# Patient Record
Sex: MALE | Race: WHITE
[De-identification: names, ages, dates, MRNs, and addresses within clinical notes are randomized per-mention and may not be internally consistent; named-entity substitution may affect disease eponyms.]

---

## 2018-03-15 PROBLEM — Z00.00 ENCOUNTER FOR PREVENTIVE HEALTH EXAMINATION: Status: ACTIVE | Noted: 2018-03-15

## 2018-03-30 ENCOUNTER — APPOINTMENT (OUTPATIENT)
Dept: UROLOGY | Facility: CLINIC | Age: 58
End: 2018-03-30
Payer: MEDICARE

## 2018-03-30 VITALS
SYSTOLIC BLOOD PRESSURE: 193 MMHG | BODY MASS INDEX: 31.83 KG/M2 | HEIGHT: 68 IN | DIASTOLIC BLOOD PRESSURE: 88 MMHG | HEART RATE: 103 BPM | WEIGHT: 210 LBS

## 2018-03-30 DIAGNOSIS — Z78.9 OTHER SPECIFIED HEALTH STATUS: ICD-10-CM

## 2018-03-30 DIAGNOSIS — N52.9 MALE ERECTILE DYSFUNCTION, UNSPECIFIED: ICD-10-CM

## 2018-03-30 DIAGNOSIS — Z85.46 PERSONAL HISTORY OF MALIGNANT NEOPLASM OF PROSTATE: ICD-10-CM

## 2018-03-30 DIAGNOSIS — R31.29 OTHER MICROSCOPIC HEMATURIA: ICD-10-CM

## 2018-03-30 DIAGNOSIS — N20.0 CALCULUS OF KIDNEY: ICD-10-CM

## 2018-03-30 LAB
BILIRUB UR QL STRIP: NORMAL
CLARITY UR: CLEAR
COLLECTION METHOD: NORMAL
GLUCOSE UR-MCNC: 150
HCG UR QL: NORMAL EU/DL
HGB UR QL STRIP.AUTO: NORMAL
KETONES UR-MCNC: NORMAL
LEUKOCYTE ESTERASE UR QL STRIP: NORMAL
NITRITE UR QL STRIP: NORMAL
PH UR STRIP: 6
PROT UR STRIP-MCNC: 30
SP GR UR STRIP: 1.02

## 2018-03-30 PROCEDURE — 81003 URINALYSIS AUTO W/O SCOPE: CPT | Mod: QW

## 2018-03-30 PROCEDURE — 99213 OFFICE O/P EST LOW 20 MIN: CPT

## 2018-06-29 ENCOUNTER — APPOINTMENT (OUTPATIENT)
Dept: UROLOGY | Facility: CLINIC | Age: 58
End: 2018-06-29
Payer: MEDICARE

## 2018-06-29 VITALS
HEIGHT: 68 IN | DIASTOLIC BLOOD PRESSURE: 66 MMHG | WEIGHT: 210 LBS | SYSTOLIC BLOOD PRESSURE: 140 MMHG | BODY MASS INDEX: 31.83 KG/M2 | HEART RATE: 85 BPM

## 2018-06-29 PROCEDURE — 99213 OFFICE O/P EST LOW 20 MIN: CPT

## 2018-06-29 RX ORDER — BICALUTAMIDE 50 MG/1
50 TABLET ORAL DAILY
Qty: 90 | Refills: 3 | Status: ACTIVE | COMMUNITY
Start: 2018-06-29 | End: 1900-01-01

## 2018-07-05 ENCOUNTER — OTHER (OUTPATIENT)
Age: 58
End: 2018-07-05

## 2018-07-06 ENCOUNTER — TRANSCRIPTION ENCOUNTER (OUTPATIENT)
Age: 58
End: 2018-07-06

## 2018-07-06 ENCOUNTER — OTHER (OUTPATIENT)
Age: 58
End: 2018-07-06

## 2018-07-06 ENCOUNTER — MESSAGE (OUTPATIENT)
Age: 58
End: 2018-07-06

## 2018-07-12 ENCOUNTER — OUTPATIENT (OUTPATIENT)
Dept: OUTPATIENT SERVICES | Facility: HOSPITAL | Age: 58
LOS: 1 days | Discharge: HOME | End: 2018-07-12

## 2018-07-12 DIAGNOSIS — N41.3 PROSTATOCYSTITIS: ICD-10-CM

## 2018-07-13 ENCOUNTER — OUTPATIENT (OUTPATIENT)
Dept: OUTPATIENT SERVICES | Facility: HOSPITAL | Age: 58
LOS: 1 days | Discharge: HOME | End: 2018-07-13

## 2018-07-13 DIAGNOSIS — C61 MALIGNANT NEOPLASM OF PROSTATE: ICD-10-CM

## 2018-07-20 ENCOUNTER — MESSAGE (OUTPATIENT)
Age: 58
End: 2018-07-20

## 2018-09-28 ENCOUNTER — APPOINTMENT (OUTPATIENT)
Dept: UROLOGY | Facility: CLINIC | Age: 58
End: 2018-09-28
Payer: MEDICARE

## 2018-09-28 VITALS
HEIGHT: 68 IN | DIASTOLIC BLOOD PRESSURE: 71 MMHG | HEART RATE: 91 BPM | SYSTOLIC BLOOD PRESSURE: 142 MMHG | BODY MASS INDEX: 31.83 KG/M2 | WEIGHT: 210 LBS

## 2018-09-28 PROCEDURE — 99213 OFFICE O/P EST LOW 20 MIN: CPT

## 2019-02-15 ENCOUNTER — APPOINTMENT (OUTPATIENT)
Dept: UROLOGY | Facility: CLINIC | Age: 59
End: 2019-02-15
Payer: MEDICARE

## 2019-02-15 VITALS
DIASTOLIC BLOOD PRESSURE: 60 MMHG | BODY MASS INDEX: 30.03 KG/M2 | SYSTOLIC BLOOD PRESSURE: 127 MMHG | HEIGHT: 68 IN | HEART RATE: 95 BPM | WEIGHT: 198.13 LBS

## 2019-02-15 PROCEDURE — 99213 OFFICE O/P EST LOW 20 MIN: CPT

## 2019-02-15 NOTE — PHYSICAL EXAM
[General Appearance - In No Acute Distress] : no acute distress [Costovertebral Angle Tenderness] : no ~M costovertebral angle tenderness [Edema] : no peripheral edema [] : no respiratory distress [Oriented To Time, Place, And Person] : oriented to person, place, and time [Normal Station and Gait] : the gait and station were normal for the patient's age

## 2019-02-15 NOTE — HISTORY OF PRESENT ILLNESS
[FreeTextEntry1] : 4-year-old with [place] 8 prostate cancer diagnosed in 2012 status post radical prostatectomy and pelvic lymph node dissection found to be stage TII C, N0. In 2013 he had high MRT for local recurrence and since has developed biochemical recurrence and is currently being followed at [facility] and is on Lupron only right now. Current PSA is 0.17 compared to 0.33 months ago. He has no urinary complaints no bone pain no flank pain or hematuria.

## 2019-08-16 ENCOUNTER — APPOINTMENT (OUTPATIENT)
Dept: UROLOGY | Facility: CLINIC | Age: 59
End: 2019-08-16
Payer: MEDICARE

## 2019-08-16 VITALS
SYSTOLIC BLOOD PRESSURE: 125 MMHG | BODY MASS INDEX: 29.1 KG/M2 | DIASTOLIC BLOOD PRESSURE: 70 MMHG | HEART RATE: 78 BPM | HEIGHT: 68 IN | WEIGHT: 192 LBS

## 2019-08-16 DIAGNOSIS — C61 MALIGNANT NEOPLASM OF PROSTATE: ICD-10-CM

## 2019-08-16 LAB
BILIRUB UR QL STRIP: NORMAL
CLARITY UR: CLEAR
COLLECTION METHOD: NORMAL
GLUCOSE UR-MCNC: NORMAL
HCG UR QL: NORMAL MG/DL
HGB UR QL STRIP.AUTO: NORMAL
KETONES UR-MCNC: NORMAL
LEUKOCYTE ESTERASE UR QL STRIP: NORMAL
NITRITE UR QL STRIP: NORMAL
PH UR STRIP: 5
PROT UR STRIP-MCNC: NORMAL
SP GR UR STRIP: 1.01

## 2019-08-16 PROCEDURE — 81003 URINALYSIS AUTO W/O SCOPE: CPT | Mod: QW

## 2019-08-16 PROCEDURE — 99213 OFFICE O/P EST LOW 20 MIN: CPT

## 2019-08-16 NOTE — HISTORY OF PRESENT ILLNESS
[FreeTextEntry1] : 59-year-old with prostate cancer, [place] 8 underwent radical prostatectomy and pelvic lymph node dissection found to be stage TII C., N0.underwent radiation therapy in 2 since then has had biochemical recurrence and bony Metastases. He is on Lupron and his under the  care at [facility]. His PSA is now 0.07 and recent bone scan shows complete resolution of bony lesions. He feels well without urinary complaint.\par \par He also has a right mid renal lesion being followed at [facility] also radiographically and is scheduled for iimaging in the fall

## 2019-08-16 NOTE — ASSESSMENT
[FreeTextEntry1] : Good response to Lupron which he is continuing and continuing to followup at [facility]. Also noted is going for removal of left knee mass associated with the synovium in the near future

## 2019-08-16 NOTE — PHYSICAL EXAM
[General Appearance - In No Acute Distress] : no acute distress [Abdomen Soft] : soft [Abdomen Tenderness] : non-tender [Costovertebral Angle Tenderness] : no ~M costovertebral angle tenderness [] : no respiratory distress [Oriented To Time, Place, And Person] : oriented to person, place, and time [Normal Station and Gait] : the gait and station were normal for the patient's age [FreeTextEntry1] : Left knee mass patient states is related to synovium

## 2020-02-18 ENCOUNTER — APPOINTMENT (OUTPATIENT)
Dept: UROLOGY | Facility: CLINIC | Age: 60
End: 2020-02-18

## 2020-08-27 ENCOUNTER — TRANSCRIPTION ENCOUNTER (OUTPATIENT)
Age: 60
End: 2020-08-27

## 2021-05-16 ENCOUNTER — EMERGENCY (EMERGENCY)
Facility: HOSPITAL | Age: 61
LOS: 0 days | Discharge: HOME | End: 2021-05-16
Attending: EMERGENCY MEDICINE | Admitting: EMERGENCY MEDICINE
Payer: MEDICARE

## 2021-05-16 ENCOUNTER — TRANSCRIPTION ENCOUNTER (OUTPATIENT)
Age: 61
End: 2021-05-16

## 2021-05-16 VITALS
OXYGEN SATURATION: 99 % | WEIGHT: 91.05 LBS | HEART RATE: 87 BPM | RESPIRATION RATE: 18 BRPM | DIASTOLIC BLOOD PRESSURE: 80 MMHG | SYSTOLIC BLOOD PRESSURE: 165 MMHG | TEMPERATURE: 98 F

## 2021-05-16 DIAGNOSIS — Z85.46 PERSONAL HISTORY OF MALIGNANT NEOPLASM OF PROSTATE: ICD-10-CM

## 2021-05-16 DIAGNOSIS — E11.9 TYPE 2 DIABETES MELLITUS WITHOUT COMPLICATIONS: ICD-10-CM

## 2021-05-16 DIAGNOSIS — R22.0 LOCALIZED SWELLING, MASS AND LUMP, HEAD: ICD-10-CM

## 2021-05-16 DIAGNOSIS — I10 ESSENTIAL (PRIMARY) HYPERTENSION: ICD-10-CM

## 2021-05-16 DIAGNOSIS — Z85.528 PERSONAL HISTORY OF OTHER MALIGNANT NEOPLASM OF KIDNEY: ICD-10-CM

## 2021-05-16 DIAGNOSIS — H92.02 OTALGIA, LEFT EAR: ICD-10-CM

## 2021-05-16 PROCEDURE — 99283 EMERGENCY DEPT VISIT LOW MDM: CPT

## 2021-05-16 NOTE — ED PROVIDER NOTE - CLINICAL SUMMARY MEDICAL DECISION MAKING FREE TEXT BOX
62 Y/O M WITH PAIN AROUND HIS L EAR X ONE WEEK. EXAM NORMAL. PT TO FOLLOW UP WITH ENT AND IMPORTANCE OF FOLLOW UP STRESSED WITH PT AND WIFE. PT PRESCRIBED AUGMENTIN AS THIS ALLEVAITED HIS SXS PREVIOUSLY. PT GIVEN STRICT RETURN INSTRUCTIONS.

## 2021-05-16 NOTE — ED PROVIDER NOTE - OBJECTIVE STATEMENT
60 y/o male with hx Prostate Ca/ Kidney Ca, HTN, DM presents to the ED c/o "I have pain to my left ear/ head for about 5 weeks. I was seen at  in [place] and took Augmentin and felt better. The pain came back on Sunday." no fever/ chills/ dental pain/ n/v/d

## 2021-05-16 NOTE — ED ADULT TRIAGE NOTE - CHIEF COMPLAINT QUOTE
Patient stated he was dx with left ear infection end of April and presents to ed with worsening left sided facial swelling

## 2021-05-16 NOTE — ED PROVIDER NOTE - NSFOLLOWUPINSTRUCTIONS_ED_ALL_ED_FT
Earache    WHAT YOU NEED TO KNOW:    An earache can be caused by a problem within your ear or from another body area. Common causes include earwax buildup, objects in your ear, injury, infections, or jaw or dental problems. Less often, earaches may be caused by arthritis in your upper spine.    DISCHARGE INSTRUCTIONS:    Return to the emergency department if:     You have a severe earache.      You have ear pain with itching, hearing loss, dizziness, a feeling of fullness in your ear, or ringing in your ears.    Contact your healthcare provider if:     Your ear pain worsens or does not go away with treatment.      You have drainage from your ear.      You have a fever.       Your outer ear becomes red, swollen, and warm.      You have questions or concerns about your condition or care.    Medicines: You may need any of the following:     NSAIDs, such as ibuprofen, help decrease swelling, pain, and fever. This medicine is available with or without a doctor's order. NSAIDs can cause stomach bleeding or kidney problems in certain people. If you take blood thinner medicine, always ask if NSAIDs are safe for you. Always read the medicine label and follow directions. Do not give these medicines to children under 6 months of age without direction from your child's healthcare provider.      Acetaminophen decreases pain and fever. It is available without a doctor's order. Ask how much to take and how often to take it. Follow directions. Acetaminophen can cause liver damage if not taken correctly.      Do not give aspirin to children under 18 years of age. Your child could develop Reye syndrome if he takes aspirin. Reye syndrome can cause life-threatening brain and liver damage. Check your child's medicine labels for aspirin, salicylates, or oil of wintergreen.       Take your medicine as directed. Call your healthcare provider if you think your medicine is not helping or if you have side effects. Tell him if you are allergic to any medicine. Keep a list of the medicines, vitamins, and herbs you take. Include the amounts, and when and why you take them. Bring the list or the pill bottles to follow-up visits. Carry your medicine list with you in case of an emergency.    Follow up with your healthcare provider as directed: Write down your questions so you remember to ask them during your visits.       © Copyright [employer] 2019 All illustrations and images included in CareNotes are the copyrighted property of A.D.A.M., Inc. or [employer].

## 2021-05-16 NOTE — ED PROVIDER NOTE - ATTENDING CONTRIBUTION TO CARE
I personally evaluated the patient. I reviewed the Resident’s or Physician Assistant’s note (as assigned above), and agree with the findings and plan except as documented in my note.   60 Y/O M PROSTATE CS ON LUPRON, RCC S/P PARTIAL NEPHRECTOMY, HTN, DLD, DM C/O L PREAURICULAR PAIN X ONE WEEK. PT HAD SIMILAR SXS 3 WEEKS AGO IN [place] WHICH RESOLVED COMPLETELY WITH A COURSE OF AUGMENTIN. NO EAR PAIN OR D/C. NO FEVER, CHILLS. NO HA. + L POSTERIOR NECK PAIN. NO RASH. NO TINNITUS. PT REPORTS SIMILAR SXS SEASONALLY IN THE SPRING. SINCE CHILDHOOD. NO N/V, DIZZINESS. [place] [facility] [place]FERRED TO ED FOR POSSIBLE PAROTITIS. VITALS NOTED. ALERT OX3 NAD WELL APPEARING. NCAT PERRL. EOMI. NOSE NORMAL. OP NORMAL. MMM. NECK SUPPLE. NO CERVICAL LAD. L TM NORMAL. EAR CANAL NORMAL. NO PRE OR POST AURICULAR LAD. B/L PAROTID NORMAL. NO EDEMA, ERYTHEMA, OR WARMTH. LUNGS CLEAR B/L. RRR. S1S2.

## 2021-05-16 NOTE — ED PROVIDER NOTE - PATIENT PORTAL LINK FT
You can access the FollowMyHealth Patient Portal offered by [facility] by registering at the following website: [email or web address]. By joining [employer]’s FollowMyHealth portal, you will also be able to view your health information using other applications (apps) compatible with our system.

## 2021-05-16 NOTE — ED PROVIDER NOTE - CARE PROVIDER_API CALL
Napoleon Jung)  Otolaryngology  [address] [place] [address], [address] [place] [address]  Phone: [phone]  Fax: [phone]  Follow Up Time:

## 2021-05-27 ENCOUNTER — APPOINTMENT (OUTPATIENT)
Dept: OTOLARYNGOLOGY | Facility: CLINIC | Age: 61
End: 2021-05-27
Payer: MEDICARE

## 2021-05-27 DIAGNOSIS — H66.90 OTITIS MEDIA, UNSPECIFIED, UNSPECIFIED EAR: ICD-10-CM

## 2021-05-27 PROCEDURE — 31231 NASAL ENDOSCOPY DX: CPT

## 2021-05-27 PROCEDURE — 99204 OFFICE O/P NEW MOD 45 MIN: CPT | Mod: 25

## 2021-05-27 RX ORDER — PREDNISONE 20 MG/1
20 TABLET ORAL
Qty: 21 | Refills: 0 | Status: ACTIVE | COMMUNITY
Start: 2021-05-27 | End: 1900-01-01

## 2021-05-27 RX ORDER — LEVOCETIRIZINE DIHYDROCHLORIDE 5 MG/1
5 TABLET ORAL
Qty: 30 | Refills: 4 | Status: ACTIVE | COMMUNITY
Start: 2021-05-27 | End: 1900-01-01

## 2021-05-27 RX ORDER — FLUTICASONE PROPIONATE 50 UG/1
50 SPRAY, METERED NASAL DAILY
Qty: 1 | Refills: 7 | Status: ACTIVE | COMMUNITY
Start: 2021-05-27 | End: 1900-01-01

## 2021-05-27 NOTE — PHYSICAL EXAM
[] : septum deviated to the left [Midline] : trachea located in midline position [Normal] : no rashes [de-identified] : nasal valve collapse, improvement with Plymouth maneuver and lateralization

## 2021-05-27 NOTE — PROCEDURE
[Recalcitrant Symptoms] : recalcitrant symptoms  [None] : none [Flexible Endoscope] : examined with the flexible endoscope [Congested] : congested [S-Shaped Deviated] : S-shape deviation [Normal] : the paranasal sinuses had no abnormalities

## 2021-05-27 NOTE — HISTORY OF PRESENT ILLNESS
[de-identified] : Patient presents today c/o nasal obstruction at left.  He has bad seasonal allergies has been taking Allegra D which has been helping . Five weeks ago had PET (prior hx iof prostate ca) came up with middle ear infection .  Has a pain on forehead , has nasal congestion , uses netti pot which has been helping .

## 2021-06-01 NOTE — ED ADULT NURSE NOTE - THROAT
Detail Level: Zone Patient Specific Counseling (Will Not Stick From Patient To Patient): Patient will manage flares with Triamcinolone cream. Patient is self pay today. Recommended soap and moisturizers that are meant for sensitive skin types. Samples given today. asymptomatic

## 2021-07-15 ENCOUNTER — APPOINTMENT (OUTPATIENT)
Dept: OTOLARYNGOLOGY | Facility: CLINIC | Age: 61
End: 2021-07-15
Payer: MEDICARE

## 2021-07-15 DIAGNOSIS — J31.0 CHRONIC RHINITIS: ICD-10-CM

## 2021-07-15 DIAGNOSIS — T48.5X5A CHRONIC RHINITIS: ICD-10-CM

## 2021-07-15 DIAGNOSIS — J34.89 OTHER SPECIFIED DISORDERS OF NOSE AND NASAL SINUSES: ICD-10-CM

## 2021-07-15 PROCEDURE — 99212 OFFICE O/P EST SF 10 MIN: CPT | Mod: 25

## 2021-07-15 PROCEDURE — 31231 NASAL ENDOSCOPY DX: CPT

## 2021-07-15 NOTE — HISTORY OF PRESENT ILLNESS
[FreeTextEntry1] : 5/27/21:  Patient presents today c/o nasal obstruction at left. He has bad seasonal allergies has been taking Allegra D which has been helping . Five weeks ago had PET (prior hx iof prostate ca) came up with middle ear infection . Has a pain on forehead , has nasal congestion , uses netti pot which has been helping . \par

## 2021-08-09 ENCOUNTER — OUTPATIENT (OUTPATIENT)
Dept: OUTPATIENT SERVICES | Facility: HOSPITAL | Age: 61
LOS: 1 days | Discharge: HOME | End: 2021-08-09
Payer: MEDICARE

## 2021-08-09 DIAGNOSIS — Z01.818 ENCOUNTER FOR OTHER PREPROCEDURAL EXAMINATION: ICD-10-CM

## 2021-08-09 PROCEDURE — 71046 X-RAY EXAM CHEST 2 VIEWS: CPT | Mod: 26

## 2021-12-23 ENCOUNTER — APPOINTMENT (OUTPATIENT)
Dept: OTOLARYNGOLOGY | Facility: CLINIC | Age: 61
End: 2021-12-23

## 2022-01-04 NOTE — ED ADULT NURSE NOTE - CHIEF COMPLAINT QUOTE
Universal Safety Interventions
Patient stated he was dx with left ear infection end of April and presents to ed with worsening left sided facial swelling

## 2023-01-01 ENCOUNTER — EMERGENCY (EMERGENCY)
Facility: HOSPITAL | Age: 63
LOS: 0 days | Discharge: HOME | End: 2023-05-26
Attending: EMERGENCY MEDICINE
Payer: MEDICARE

## 2023-01-01 ENCOUNTER — INPATIENT (INPATIENT)
Facility: HOSPITAL | Age: 63
LOS: 0 days | DRG: 64 | End: 2023-05-29
Attending: STUDENT IN AN ORGANIZED HEALTH CARE EDUCATION/TRAINING PROGRAM | Admitting: STUDENT IN AN ORGANIZED HEALTH CARE EDUCATION/TRAINING PROGRAM
Payer: MEDICARE

## 2023-01-01 VITALS
SYSTOLIC BLOOD PRESSURE: 179 MMHG | WEIGHT: 197.09 LBS | OXYGEN SATURATION: 99 % | HEART RATE: 82 BPM | RESPIRATION RATE: 17 BRPM | TEMPERATURE: 98 F | DIASTOLIC BLOOD PRESSURE: 81 MMHG

## 2023-01-01 VITALS
RESPIRATION RATE: 20 BRPM | DIASTOLIC BLOOD PRESSURE: 83 MMHG | OXYGEN SATURATION: 96 % | SYSTOLIC BLOOD PRESSURE: 145 MMHG | HEART RATE: 123 BPM

## 2023-01-01 VITALS — OXYGEN SATURATION: 76 %

## 2023-01-01 DIAGNOSIS — M79.662 PAIN IN LEFT LOWER LEG: ICD-10-CM

## 2023-01-01 DIAGNOSIS — Z85.46 PERSONAL HISTORY OF MALIGNANT NEOPLASM OF PROSTATE: ICD-10-CM

## 2023-01-01 DIAGNOSIS — Z79.01 LONG TERM (CURRENT) USE OF ANTICOAGULANTS: ICD-10-CM

## 2023-01-01 DIAGNOSIS — M54.9 DORSALGIA, UNSPECIFIED: ICD-10-CM

## 2023-01-01 DIAGNOSIS — E78.5 HYPERLIPIDEMIA, UNSPECIFIED: ICD-10-CM

## 2023-01-01 DIAGNOSIS — I62.9 NONTRAUMATIC INTRACRANIAL HEMORRHAGE, UNSPECIFIED: ICD-10-CM

## 2023-01-01 DIAGNOSIS — M25.552 PAIN IN LEFT HIP: ICD-10-CM

## 2023-01-01 DIAGNOSIS — Z95.4 PRESENCE OF OTHER HEART-VALVE REPLACEMENT: ICD-10-CM

## 2023-01-01 DIAGNOSIS — Z98.890 OTHER SPECIFIED POSTPROCEDURAL STATES: Chronic | ICD-10-CM

## 2023-01-01 DIAGNOSIS — I10 ESSENTIAL (PRIMARY) HYPERTENSION: ICD-10-CM

## 2023-01-01 LAB
ABO RH CONFIRMATION: SIGNIFICANT CHANGE UP
ALBUMIN SERPL ELPH-MCNC: 4.2 G/DL — SIGNIFICANT CHANGE UP (ref 3.5–5.2)
ALP SERPL-CCNC: 102 U/L — SIGNIFICANT CHANGE UP (ref 30–115)
ALT FLD-CCNC: 19 U/L — SIGNIFICANT CHANGE UP (ref 0–41)
ANION GAP SERPL CALC-SCNC: 11 MMOL/L — SIGNIFICANT CHANGE UP (ref 7–14)
ANION GAP SERPL CALC-SCNC: 11 MMOL/L — SIGNIFICANT CHANGE UP (ref 7–14)
ANION GAP SERPL CALC-SCNC: 17 MMOL/L — HIGH (ref 7–14)
APPEARANCE UR: CLEAR — SIGNIFICANT CHANGE UP
APTT BLD: 34.3 SEC — SIGNIFICANT CHANGE UP (ref 27–39.2)
AST SERPL-CCNC: 32 U/L — SIGNIFICANT CHANGE UP (ref 0–41)
BACTERIA # UR AUTO: NEGATIVE — SIGNIFICANT CHANGE UP
BASE EXCESS BLDA CALC-SCNC: -5.4 MMOL/L — LOW (ref -2–3)
BASE EXCESS BLDV CALC-SCNC: -10.2 MMOL/L — LOW (ref -2–3)
BASOPHILS # BLD AUTO: 0.13 K/UL — SIGNIFICANT CHANGE UP (ref 0–0.2)
BASOPHILS NFR BLD AUTO: 0.5 % — SIGNIFICANT CHANGE UP (ref 0–1)
BILIRUB SERPL-MCNC: 0.3 MG/DL — SIGNIFICANT CHANGE UP (ref 0.2–1.2)
BILIRUB UR-MCNC: NEGATIVE — SIGNIFICANT CHANGE UP
BLD GP AB SCN SERPL QL: SIGNIFICANT CHANGE UP
BUN SERPL-MCNC: 13 MG/DL — SIGNIFICANT CHANGE UP (ref 10–20)
BUN SERPL-MCNC: 17 MG/DL — SIGNIFICANT CHANGE UP (ref 10–20)
BUN SERPL-MCNC: 18 MG/DL — SIGNIFICANT CHANGE UP (ref 10–20)
CA-I SERPL-SCNC: 1.18 MMOL/L — SIGNIFICANT CHANGE UP (ref 1.15–1.33)
CALCIUM SERPL-MCNC: 7.8 MG/DL — LOW (ref 8.4–10.5)
CALCIUM SERPL-MCNC: 8.2 MG/DL — LOW (ref 8.4–10.5)
CALCIUM SERPL-MCNC: 9.1 MG/DL — SIGNIFICANT CHANGE UP (ref 8.4–10.4)
CHLORIDE SERPL-SCNC: 103 MMOL/L — SIGNIFICANT CHANGE UP (ref 98–110)
CHLORIDE SERPL-SCNC: 125 MMOL/L — HIGH (ref 98–110)
CHLORIDE SERPL-SCNC: 130 MMOL/L — HIGH (ref 98–110)
CO2 SERPL-SCNC: 18 MMOL/L — SIGNIFICANT CHANGE UP (ref 17–32)
CO2 SERPL-SCNC: 18 MMOL/L — SIGNIFICANT CHANGE UP (ref 17–32)
CO2 SERPL-SCNC: 21 MMOL/L — SIGNIFICANT CHANGE UP (ref 17–32)
COLOR SPEC: SIGNIFICANT CHANGE UP
CREAT SERPL-MCNC: 1 MG/DL — SIGNIFICANT CHANGE UP (ref 0.7–1.5)
CREAT SERPL-MCNC: 1.4 MG/DL — SIGNIFICANT CHANGE UP (ref 0.7–1.5)
CREAT SERPL-MCNC: 1.4 MG/DL — SIGNIFICANT CHANGE UP (ref 0.7–1.5)
CULTURE RESULTS: NO GROWTH — SIGNIFICANT CHANGE UP
DIFF PNL FLD: ABNORMAL
EGFR: 56 ML/MIN/1.73M2 — LOW
EGFR: 56 ML/MIN/1.73M2 — LOW
EGFR: 84 ML/MIN/1.73M2 — SIGNIFICANT CHANGE UP
EOSINOPHIL # BLD AUTO: 0.27 K/UL — SIGNIFICANT CHANGE UP (ref 0–0.7)
EOSINOPHIL NFR BLD AUTO: 1.1 % — SIGNIFICANT CHANGE UP (ref 0–8)
EPI CELLS # UR: 3 /HPF — SIGNIFICANT CHANGE UP (ref 0–5)
GAS PNL BLDA: SIGNIFICANT CHANGE UP
GAS PNL BLDV: 136 MMOL/L — SIGNIFICANT CHANGE UP (ref 136–145)
GAS PNL BLDV: SIGNIFICANT CHANGE UP
GLUCOSE SERPL-MCNC: 177 MG/DL — HIGH (ref 70–99)
GLUCOSE SERPL-MCNC: 197 MG/DL — HIGH (ref 70–99)
GLUCOSE SERPL-MCNC: 274 MG/DL — HIGH (ref 70–99)
GLUCOSE UR QL: NEGATIVE — SIGNIFICANT CHANGE UP
HCO3 BLDA-SCNC: 19 MMOL/L — LOW (ref 21–28)
HCO3 BLDV-SCNC: 22 MMOL/L — SIGNIFICANT CHANGE UP (ref 22–29)
HCT VFR BLD CALC: 39.3 % — LOW (ref 42–52)
HCT VFR BLD CALC: 39.8 % — LOW (ref 42–52)
HCT VFR BLDA CALC: 40 % — SIGNIFICANT CHANGE UP (ref 39–51)
HCV AB S/CO SERPL IA: 0.03 COI — SIGNIFICANT CHANGE UP
HCV AB SERPL-IMP: SIGNIFICANT CHANGE UP
HGB BLD CALC-MCNC: 13.4 G/DL — SIGNIFICANT CHANGE UP (ref 12.6–17.4)
HGB BLD-MCNC: 13.1 G/DL — LOW (ref 14–18)
HGB BLD-MCNC: 13.2 G/DL — LOW (ref 14–18)
HYALINE CASTS # UR AUTO: 0 /LPF — SIGNIFICANT CHANGE UP (ref 0–7)
IMM GRANULOCYTES NFR BLD AUTO: 1.4 % — HIGH (ref 0.1–0.3)
INR BLD: 1.2 RATIO — SIGNIFICANT CHANGE UP (ref 0.65–1.3)
KETONES UR-MCNC: NEGATIVE — SIGNIFICANT CHANGE UP
LACTATE BLDV-MCNC: 6.1 MMOL/L — CRITICAL HIGH (ref 0.5–2)
LACTATE SERPL-SCNC: 6.3 MMOL/L — CRITICAL HIGH (ref 0.7–2)
LEUKOCYTE ESTERASE UR-ACNC: NEGATIVE — SIGNIFICANT CHANGE UP
LIDOCAIN IGE QN: 26 U/L — SIGNIFICANT CHANGE UP (ref 7–60)
LYMPHOCYTES # BLD AUTO: 15.6 % — LOW (ref 20.5–51.1)
LYMPHOCYTES # BLD AUTO: 3.8 K/UL — HIGH (ref 1.2–3.4)
MAGNESIUM SERPL-MCNC: 2.1 MG/DL — SIGNIFICANT CHANGE UP (ref 1.8–2.4)
MAGNESIUM SERPL-MCNC: 2.3 MG/DL — SIGNIFICANT CHANGE UP (ref 1.8–2.4)
MCHC RBC-ENTMCNC: 28.9 PG — SIGNIFICANT CHANGE UP (ref 27–31)
MCHC RBC-ENTMCNC: 29.6 PG — SIGNIFICANT CHANGE UP (ref 27–31)
MCHC RBC-ENTMCNC: 33.2 G/DL — SIGNIFICANT CHANGE UP (ref 32–37)
MCHC RBC-ENTMCNC: 33.3 G/DL — SIGNIFICANT CHANGE UP (ref 32–37)
MCV RBC AUTO: 86.8 FL — SIGNIFICANT CHANGE UP (ref 80–94)
MCV RBC AUTO: 89.2 FL — SIGNIFICANT CHANGE UP (ref 80–94)
MONOCYTES # BLD AUTO: 2.35 K/UL — HIGH (ref 0.1–0.6)
MONOCYTES NFR BLD AUTO: 9.7 % — HIGH (ref 1.7–9.3)
NEUTROPHILS # BLD AUTO: 17.44 K/UL — HIGH (ref 1.4–6.5)
NEUTROPHILS NFR BLD AUTO: 71.7 % — SIGNIFICANT CHANGE UP (ref 42.2–75.2)
NITRITE UR-MCNC: NEGATIVE — SIGNIFICANT CHANGE UP
NRBC # BLD: 0 /100 WBCS — SIGNIFICANT CHANGE UP (ref 0–0)
NRBC # BLD: 0 /100 WBCS — SIGNIFICANT CHANGE UP (ref 0–0)
NT-PROBNP SERPL-SCNC: 2376 PG/ML — HIGH (ref 0–300)
PCO2 BLDA: 34 MMHG — LOW (ref 35–48)
PCO2 BLDV: 81 MMHG — HIGH (ref 42–55)
PH BLDA: 7.36 — SIGNIFICANT CHANGE UP (ref 7.35–7.45)
PH BLDV: 7.04 — LOW (ref 7.32–7.43)
PH UR: 6 — SIGNIFICANT CHANGE UP (ref 5–8)
PHOSPHATE SERPL-MCNC: 2.2 MG/DL — SIGNIFICANT CHANGE UP (ref 2.1–4.9)
PHOSPHATE SERPL-MCNC: 6 MG/DL — HIGH (ref 2.1–4.9)
PLATELET # BLD AUTO: 241 K/UL — SIGNIFICANT CHANGE UP (ref 130–400)
PLATELET # BLD AUTO: 325 K/UL — SIGNIFICANT CHANGE UP (ref 130–400)
PMV BLD: 9 FL — SIGNIFICANT CHANGE UP (ref 7.4–10.4)
PMV BLD: 9.2 FL — SIGNIFICANT CHANGE UP (ref 7.4–10.4)
PO2 BLDA: 58 MMHG — LOW (ref 83–108)
PO2 BLDV: 184 MMHG — SIGNIFICANT CHANGE UP
POTASSIUM BLDV-SCNC: 5.6 MMOL/L — HIGH (ref 3.5–5.1)
POTASSIUM SERPL-MCNC: 4.1 MMOL/L — SIGNIFICANT CHANGE UP (ref 3.5–5)
POTASSIUM SERPL-MCNC: 4.3 MMOL/L — SIGNIFICANT CHANGE UP (ref 3.5–5)
POTASSIUM SERPL-MCNC: 4.5 MMOL/L — SIGNIFICANT CHANGE UP (ref 3.5–5)
POTASSIUM SERPL-SCNC: 4.1 MMOL/L — SIGNIFICANT CHANGE UP (ref 3.5–5)
POTASSIUM SERPL-SCNC: 4.3 MMOL/L — SIGNIFICANT CHANGE UP (ref 3.5–5)
POTASSIUM SERPL-SCNC: 4.5 MMOL/L — SIGNIFICANT CHANGE UP (ref 3.5–5)
PROT SERPL-MCNC: 7.7 G/DL — SIGNIFICANT CHANGE UP (ref 6–8)
PROT UR-MCNC: SIGNIFICANT CHANGE UP
PROTHROM AB SERPL-ACNC: 13.8 SEC — HIGH (ref 9.95–12.87)
RBC # BLD: 4.46 M/UL — LOW (ref 4.7–6.1)
RBC # BLD: 4.53 M/UL — LOW (ref 4.7–6.1)
RBC # FLD: 13.1 % — SIGNIFICANT CHANGE UP (ref 11.5–14.5)
RBC # FLD: 15.5 % — HIGH (ref 11.5–14.5)
RBC CASTS # UR COMP ASSIST: 18 /HPF — HIGH (ref 0–4)
SAO2 % BLDA: 100 % — HIGH (ref 94–98)
SAO2 % BLDV: 100 % — SIGNIFICANT CHANGE UP
SODIUM SERPL-SCNC: 141 MMOL/L — SIGNIFICANT CHANGE UP (ref 135–146)
SODIUM SERPL-SCNC: 154 MMOL/L — HIGH (ref 135–146)
SODIUM SERPL-SCNC: 159 MMOL/L — HIGH (ref 135–146)
SP GR SPEC: 1.02 — SIGNIFICANT CHANGE UP (ref 1.01–1.03)
SPECIMEN SOURCE: SIGNIFICANT CHANGE UP
TROPONIN T SERPL-MCNC: <0.01 NG/ML — SIGNIFICANT CHANGE UP
UROBILINOGEN FLD QL: SIGNIFICANT CHANGE UP
WBC # BLD: 13.7 K/UL — HIGH (ref 4.8–10.8)
WBC # BLD: 24.32 K/UL — HIGH (ref 4.8–10.8)
WBC # FLD AUTO: 13.7 K/UL — HIGH (ref 4.8–10.8)
WBC # FLD AUTO: 24.32 K/UL — HIGH (ref 4.8–10.8)
WBC UR QL: 3 /HPF — SIGNIFICANT CHANGE UP (ref 0–5)

## 2023-01-01 PROCEDURE — 96372 THER/PROPH/DIAG INJ SC/IM: CPT

## 2023-01-01 PROCEDURE — 99283 EMERGENCY DEPT VISIT LOW MDM: CPT | Mod: 25

## 2023-01-01 PROCEDURE — 80048 BASIC METABOLIC PNL TOTAL CA: CPT

## 2023-01-01 PROCEDURE — 83605 ASSAY OF LACTIC ACID: CPT

## 2023-01-01 PROCEDURE — 70450 CT HEAD/BRAIN W/O DYE: CPT | Mod: 26,MA

## 2023-01-01 PROCEDURE — 85027 COMPLETE CBC AUTOMATED: CPT

## 2023-01-01 PROCEDURE — 71045 X-RAY EXAM CHEST 1 VIEW: CPT | Mod: 26,77

## 2023-01-01 PROCEDURE — 36415 COLL VENOUS BLD VENIPUNCTURE: CPT

## 2023-01-01 PROCEDURE — 94002 VENT MGMT INPAT INIT DAY: CPT

## 2023-01-01 PROCEDURE — 36556 INSERT NON-TUNNEL CV CATH: CPT

## 2023-01-01 PROCEDURE — 84100 ASSAY OF PHOSPHORUS: CPT

## 2023-01-01 PROCEDURE — 99284 EMERGENCY DEPT VISIT MOD MDM: CPT | Mod: FS

## 2023-01-01 PROCEDURE — 84295 ASSAY OF SERUM SODIUM: CPT

## 2023-01-01 PROCEDURE — 86803 HEPATITIS C AB TEST: CPT

## 2023-01-01 PROCEDURE — 71045 X-RAY EXAM CHEST 1 VIEW: CPT

## 2023-01-01 PROCEDURE — 99291 CRITICAL CARE FIRST HOUR: CPT

## 2023-01-01 PROCEDURE — 51703 INSERT BLADDER CATH COMPLEX: CPT

## 2023-01-01 PROCEDURE — 73502 X-RAY EXAM HIP UNI 2-3 VIEWS: CPT | Mod: LT

## 2023-01-01 PROCEDURE — 82330 ASSAY OF CALCIUM: CPT

## 2023-01-01 PROCEDURE — 99291 CRITICAL CARE FIRST HOUR: CPT | Mod: 25

## 2023-01-01 PROCEDURE — 99222 1ST HOSP IP/OBS MODERATE 55: CPT

## 2023-01-01 PROCEDURE — 84132 ASSAY OF SERUM POTASSIUM: CPT

## 2023-01-01 PROCEDURE — 93010 ELECTROCARDIOGRAM REPORT: CPT

## 2023-01-01 PROCEDURE — 73502 X-RAY EXAM HIP UNI 2-3 VIEWS: CPT | Mod: 26,LT

## 2023-01-01 PROCEDURE — 82803 BLOOD GASES ANY COMBINATION: CPT

## 2023-01-01 PROCEDURE — 83735 ASSAY OF MAGNESIUM: CPT

## 2023-01-01 PROCEDURE — 85018 HEMOGLOBIN: CPT

## 2023-01-01 PROCEDURE — 85014 HEMATOCRIT: CPT

## 2023-01-01 PROCEDURE — 74177 CT ABD & PELVIS W/CONTRAST: CPT | Mod: 26,MA

## 2023-01-01 PROCEDURE — 71275 CT ANGIOGRAPHY CHEST: CPT | Mod: 26,MA

## 2023-01-01 PROCEDURE — 71045 X-RAY EXAM CHEST 1 VIEW: CPT | Mod: 26

## 2023-01-01 PROCEDURE — 81001 URINALYSIS AUTO W/SCOPE: CPT

## 2023-01-01 PROCEDURE — 31500 INSERT EMERGENCY AIRWAY: CPT

## 2023-01-01 RX ORDER — LABETALOL HCL 100 MG
10 TABLET ORAL ONCE
Refills: 0 | Status: COMPLETED | OUTPATIENT
Start: 2023-01-01 | End: 2023-01-01

## 2023-01-01 RX ORDER — MANNITOL
100 POWDER (GRAM) MISCELLANEOUS ONCE
Refills: 0 | Status: COMPLETED | OUTPATIENT
Start: 2023-01-01 | End: 2023-01-01

## 2023-01-01 RX ORDER — MORPHINE SULFATE 50 MG/1
4 CAPSULE, EXTENDED RELEASE ORAL
Refills: 0 | Status: DISCONTINUED | OUTPATIENT
Start: 2023-01-01 | End: 2023-01-01

## 2023-01-01 RX ORDER — METHOCARBAMOL 500 MG/1
2 TABLET, FILM COATED ORAL
Qty: 24 | Refills: 0
Start: 2023-01-01 | End: 2023-01-01

## 2023-01-01 RX ORDER — TRANEXAMIC ACID 100 MG/ML
2000 INJECTION, SOLUTION INTRAVENOUS ONCE
Refills: 0 | Status: COMPLETED | OUTPATIENT
Start: 2023-01-01 | End: 2023-01-01

## 2023-01-01 RX ORDER — SODIUM CHLORIDE 9 MG/ML
500 INJECTION INTRAMUSCULAR; INTRAVENOUS; SUBCUTANEOUS ONCE
Refills: 0 | Status: COMPLETED | OUTPATIENT
Start: 2023-01-01 | End: 2023-01-01

## 2023-01-01 RX ORDER — MORPHINE SULFATE 50 MG/1
2 CAPSULE, EXTENDED RELEASE ORAL ONCE
Refills: 0 | Status: DISCONTINUED | OUTPATIENT
Start: 2023-01-01 | End: 2023-01-01

## 2023-01-01 RX ORDER — VECURONIUM BROMIDE 20 MG/1
10 INJECTION, POWDER, FOR SOLUTION INTRAVENOUS ONCE
Refills: 0 | Status: COMPLETED | OUTPATIENT
Start: 2023-01-01 | End: 2023-01-01

## 2023-01-01 RX ORDER — ROBINUL 0.2 MG/ML
0.2 INJECTION INTRAMUSCULAR; INTRAVENOUS EVERY 6 HOURS
Refills: 0 | Status: DISCONTINUED | OUTPATIENT
Start: 2023-01-01 | End: 2023-01-01

## 2023-01-01 RX ORDER — PROPOFOL 10 MG/ML
5 INJECTION, EMULSION INTRAVENOUS
Qty: 1000 | Refills: 0 | Status: DISCONTINUED | OUTPATIENT
Start: 2023-01-01 | End: 2023-01-01

## 2023-01-01 RX ORDER — CHLORHEXIDINE GLUCONATE 213 G/1000ML
1 SOLUTION TOPICAL
Refills: 0 | Status: DISCONTINUED | OUTPATIENT
Start: 2023-01-01 | End: 2023-01-01

## 2023-01-01 RX ORDER — SODIUM CHLORIDE 9 MG/ML
30 INJECTION INTRAMUSCULAR; INTRAVENOUS; SUBCUTANEOUS ONCE
Refills: 0 | Status: COMPLETED | OUTPATIENT
Start: 2023-01-01 | End: 2023-01-01

## 2023-01-01 RX ORDER — CHLORHEXIDINE GLUCONATE 213 G/1000ML
15 SOLUTION TOPICAL EVERY 12 HOURS
Refills: 0 | Status: DISCONTINUED | OUTPATIENT
Start: 2023-01-01 | End: 2023-01-01

## 2023-01-01 RX ORDER — KETOROLAC TROMETHAMINE 30 MG/ML
30 SYRINGE (ML) INJECTION ONCE
Refills: 0 | Status: DISCONTINUED | OUTPATIENT
Start: 2023-01-01 | End: 2023-01-01

## 2023-01-01 RX ORDER — FENTANYL CITRATE 50 UG/ML
0.5 INJECTION INTRAVENOUS
Qty: 2500 | Refills: 0 | Status: DISCONTINUED | OUTPATIENT
Start: 2023-01-01 | End: 2023-01-01

## 2023-01-01 RX ORDER — NICARDIPINE HYDROCHLORIDE 30 MG/1
5 CAPSULE, EXTENDED RELEASE ORAL
Qty: 40 | Refills: 0 | Status: DISCONTINUED | OUTPATIENT
Start: 2023-01-01 | End: 2023-01-01

## 2023-01-01 RX ORDER — ACETAMINOPHEN 500 MG
650 TABLET ORAL ONCE
Refills: 0 | Status: COMPLETED | OUTPATIENT
Start: 2023-01-01 | End: 2023-01-01

## 2023-01-01 RX ORDER — LIDOCAINE 4 G/100G
1 CREAM TOPICAL
Qty: 1 | Refills: 0
Start: 2023-01-01 | End: 2023-06-24

## 2023-01-01 RX ORDER — METHOCARBAMOL 500 MG/1
1500 TABLET, FILM COATED ORAL ONCE
Refills: 0 | Status: COMPLETED | OUTPATIENT
Start: 2023-01-01 | End: 2023-01-01

## 2023-01-01 RX ORDER — LIDOCAINE 4 G/100G
1 CREAM TOPICAL ONCE
Refills: 0 | Status: COMPLETED | OUTPATIENT
Start: 2023-01-01 | End: 2023-01-01

## 2023-01-01 RX ORDER — SODIUM CHLORIDE 5 G/100ML
500 INJECTION, SOLUTION INTRAVENOUS
Refills: 0 | Status: DISCONTINUED | OUTPATIENT
Start: 2023-01-01 | End: 2023-01-01

## 2023-01-01 RX ORDER — PANTOPRAZOLE SODIUM 20 MG/1
40 TABLET, DELAYED RELEASE ORAL ONCE
Refills: 0 | Status: COMPLETED | OUTPATIENT
Start: 2023-01-01 | End: 2023-01-01

## 2023-01-01 RX ORDER — NOREPINEPHRINE BITARTRATE/D5W 8 MG/250ML
0.05 PLASTIC BAG, INJECTION (ML) INTRAVENOUS
Qty: 16 | Refills: 0 | Status: DISCONTINUED | OUTPATIENT
Start: 2023-01-01 | End: 2023-01-01

## 2023-01-01 RX ADMIN — Medication 30 MILLIGRAM(S): at 10:52

## 2023-01-01 RX ADMIN — SODIUM CHLORIDE 1000 MILLILITER(S): 9 INJECTION INTRAMUSCULAR; INTRAVENOUS; SUBCUTANEOUS at 12:52

## 2023-01-01 RX ADMIN — CHLORHEXIDINE GLUCONATE 15 MILLILITER(S): 213 SOLUTION TOPICAL at 06:20

## 2023-01-01 RX ADMIN — Medication 10 MILLIGRAM(S): at 10:10

## 2023-01-01 RX ADMIN — MORPHINE SULFATE 2 MILLIGRAM(S): 50 CAPSULE, EXTENDED RELEASE ORAL at 14:13

## 2023-01-01 RX ADMIN — Medication 2000 GRAM(S): at 09:33

## 2023-01-01 RX ADMIN — FENTANYL CITRATE 4.92 MICROGRAM(S)/KG/HR: 50 INJECTION INTRAVENOUS at 09:09

## 2023-01-01 RX ADMIN — ROBINUL 0.2 MILLIGRAM(S): 0.2 INJECTION INTRAMUSCULAR; INTRAVENOUS at 14:14

## 2023-01-01 RX ADMIN — NICARDIPINE HYDROCHLORIDE 25 MG/HR: 30 CAPSULE, EXTENDED RELEASE ORAL at 11:24

## 2023-01-01 RX ADMIN — SODIUM CHLORIDE 50 MILLILITER(S): 5 INJECTION, SOLUTION INTRAVENOUS at 12:51

## 2023-01-01 RX ADMIN — METHOCARBAMOL 1500 MILLIGRAM(S): 500 TABLET, FILM COATED ORAL at 10:51

## 2023-01-01 RX ADMIN — CHLORHEXIDINE GLUCONATE 1 APPLICATION(S): 213 SOLUTION TOPICAL at 06:20

## 2023-01-01 RX ADMIN — Medication 10 MILLIGRAM(S): at 09:48

## 2023-01-01 RX ADMIN — LIDOCAINE 1 PATCH: 4 CREAM TOPICAL at 10:55

## 2023-01-01 RX ADMIN — PANTOPRAZOLE SODIUM 40 MILLIGRAM(S): 20 TABLET, DELAYED RELEASE ORAL at 07:10

## 2023-01-01 RX ADMIN — VECURONIUM BROMIDE 10 MILLIGRAM(S): 20 INJECTION, POWDER, FOR SOLUTION INTRAVENOUS at 07:15

## 2023-01-01 RX ADMIN — PROPOFOL 2.95 MICROGRAM(S)/KG/MIN: 10 INJECTION, EMULSION INTRAVENOUS at 09:09

## 2023-01-01 RX ADMIN — Medication 2 MILLIGRAM(S): at 14:14

## 2023-01-01 RX ADMIN — Medication 650 MILLIGRAM(S): at 10:51

## 2023-01-01 RX ADMIN — TRANEXAMIC ACID 240 MILLIGRAM(S): 100 INJECTION, SOLUTION INTRAVENOUS at 07:35

## 2023-01-01 RX ADMIN — SODIUM CHLORIDE 30 MILLILITER(S): 9 INJECTION INTRAMUSCULAR; INTRAVENOUS; SUBCUTANEOUS at 09:34

## 2023-05-26 NOTE — ED ADULT NURSE NOTE - NSFALLUNIVINTERV_ED_ALL_ED
Bed/Stretcher in lowest position, wheels locked, appropriate side rails in place/Call bell, personal items and telephone in reach/Instruct patient to call for assistance before getting out of bed/chair/stretcher/Non-slip footwear applied when patient is off stretcher/Vista to call system/Physically safe environment - no spills, clutter or unnecessary equipment/Purposeful proactive rounding/Room/bathroom lighting operational, light cord in reach

## 2023-05-26 NOTE — ED ADULT NURSE NOTE - NS_SISCREENINGSR_GEN_ALL_ED
Josue Hyman  is a 45 y.o. female, No obstetric history on file., No LMP recorded. Patient is postmenopausal.,  who is seen for vaginal d/c with occ odor,pelvic pain, and painful intercourse, sometimes. Onset was 2 months ago, off and on. Symptoms have been unchanged since    HISTORY:    Current Outpatient Medications on File Prior to Visit   Medication Sig Dispense Refill    SYNTHROID 25 MCG tablet TAKE 1 TABLET (25 MCG) BY MOUTH DAILY SYNTHROID BRAND      escitalopram (LEXAPRO) 20 MG tablet       Multiple Vitamin (MULTIVITAMIN PO) Take by mouth      estradiol (ESTRACE) 1 MG tablet Take 1 mg by mouth daily (Patient not taking: Reported on 5/26/2023)      progesterone (PROMETRIUM) 100 MG CAPS capsule Take 100 mg by mouth daily (Patient not taking: Reported on 5/26/2023)       No current facility-administered medications on file prior to visit. ROS:  Review of Giovanny Joyner  has a past medical history of Thyroid disease. Thu Tam Previous surgeries include  has no past surgical history on file. Thu Tam Her current meds are   Current Outpatient Medications:     SYNTHROID 25 MCG tablet, TAKE 1 TABLET (25 MCG) BY MOUTH DAILY SYNTHROID BRAND, Disp: , Rfl:     escitalopram (LEXAPRO) 20 MG tablet, , Disp: , Rfl:     metroNIDAZOLE (METROGEL) 0.75 % vaginal gel, Place 1 Applicatorful vaginally daily for 5 days, Disp: 1 each, Rfl: 0    Multiple Vitamin (MULTIVITAMIN PO), Take by mouth, Disp: , Rfl:     estradiol (ESTRACE) 1 MG tablet, Take 1 mg by mouth daily (Patient not taking: Reported on 5/26/2023), Disp: , Rfl:     progesterone (PROMETRIUM) 100 MG CAPS capsule, Take 100 mg by mouth daily (Patient not taking: Reported on 5/26/2023), Disp: , Rfl:      Family history is significant for family history includes Diabetes in her maternal grandfather; Heart Attack in her maternal grandmother; Hypertension in her father, maternal grandfather, and mother. .       PHYSICAL EXAM:  Blood pressure 98/64, height 5' 2\" (1.575 m), weight 119
Negative

## 2023-05-26 NOTE — ED PROVIDER NOTE - ATTENDING APP SHARED VISIT CONTRIBUTION OF CARE
64 yo M pmh of prostace ca, aortic and mitral value replacement (mechanical on xarelto) presents with left hip pain. States that 2 week ago he fell off of a hammock that he braces with his hands but states that he had some left hip pain. no head trauma or loc. Now having pain to the left hip radiating down his leg. no numbness, tingling or weakness. Able to ambulate. no changes in urination or bowel habits. no fevers.     CONSTITUTIONAL: Well-developed; well-nourished; in no acute distress.   SKIN: warm, dry  HEAD: Normocephalic; atraumatic.  EYES: PERRL, EOMI, no conjunctival erythema  ENT: No nasal discharge; airway clear.  NECK: Supple; non tender.  CARD: S1, S2 normal;  Regular rate and rhythm.   RESP: No wheezes, rales or rhonchi.  ABD: soft non tender, non distended, no rebound or guarding  EXT: Normal ROM.  5/5 strength in all 4 extremities. no midline spinal tenderness, normal gait. + left sacral tenderness. 2+ pulses, neurovascularly intact.   LYMPH: No acute cervical adenopathy.  NEURO: Alert, oriented, grossly unremarkable. neurovascularly intact  PSYCH: Cooperative, appropriate.

## 2023-05-26 NOTE — ED PROVIDER NOTE - CARE PROVIDER_API CALL
Charles Paez  Pain Medicine  [address] [place] [address]  Phone: [phone]  Fax: [phone]  Follow Up Time: 1-3 Days

## 2023-05-26 NOTE — ED ADULT NURSE NOTE - OBJECTIVE STATEMENT
63y male c/o back pain radiate to the groin and leg. pt report numbness and tickling of left leg, denies history of DM. pt denies n/v/d/fever/chills. pt states that he had pmhx of prostate cancer, kidney cancer, heart valve surgery.

## 2023-05-26 NOTE — ED PROVIDER NOTE - NSCAREINITIATED _GEN_ER
Gabriella Love) Bexarotene Pregnancy And Lactation Text: This medication is Pregnancy Category X and should not be given to women who are pregnant or may become pregnant. This medication should not be used if you are breast feeding.

## 2023-05-26 NOTE — ED PROVIDER NOTE - PHYSICAL EXAMINATION
CONSTITUTIONAL: Well-developed; well-nourished; in no acute distress, nontoxic appearing  SKIN: skin exam is warm and dry  ENT: MMM  NECK: ROM intact.  CARD: S1, S2 normal, no murmur  RESP: No wheezes, rales or rhonchi. Good air movement bilaterally  ABD: soft; non-distended; non-tender.   EXT: +L hip TTP, +TTP overlying L sciatic notch, no skin changes. FROM LE b/l. no midline tenderness   NEURO: awake, alert, following commands, oriented, grossly unremarkable. No Focal deficits. GCS 15.   PSYCH: Cooperative, appropriate.

## 2023-05-26 NOTE — ED PROVIDER NOTE - OBJECTIVE STATEMENT
63 year old male, past medical history prostate ca in remission, htn, hdl, who presents with L lower leg pain. patient with L hip pain/back pain radiating to LLE x5 days. denies f/c, abd pain, nausea/vomiting/diarrhea, urinary symptoms, skin changes, weakness. no falls.

## 2023-05-26 NOTE — ED ADULT TRIAGE NOTE - CHIEF COMPLAINT QUOTE
Pt BIBA complaining of left leg pain radiating from back down. states the pain is making him weak. states he fell two weeks ago off of a hammock. states he has been taking pain medication he has left over from open heart

## 2023-05-26 NOTE — ED PROVIDER NOTE - NSFOLLOWUPINSTRUCTIONS_ED_ALL_ED_FT
Please follow up with your primary care doctor in 1-3 days  Please be aware of any new or worsening signs or symptoms that should prompt your return to the ER.      Back Pain    WHAT YOU NEED TO KNOW:    Back pain is common. It can be caused by many conditions, such as arthritis or the breakdown of spinal discs. Your risk for back pain is increased by injuries, lack of activity, or repeated bending and twisting. You may feel sore or stiff on one or both sides of your back. The pain may spread to your buttocks or thighs.    DISCHARGE INSTRUCTIONS:    Return to the emergency department if:     You have pain, numbness, or weakness in one or both legs.      Your pain becomes so severe that you cannot walk.      You cannot control your urine or bowel movements.      You have severe back pain with chest pain.      You have severe back pain, nausea, and vomiting.      You have severe back pain that spreads to your side or genital area.    Contact your healthcare provider if:     You have back pain that does not get better with rest and pain medicine.      You have a fever.      You have pain that worsens when you are on your back or when you rest.      You have pain that worsens when you cough or sneeze.      You lose weight without trying.      You have questions or concerns about your condition or care.    Medicines:     NSAIDs help decrease swelling and pain. This medicine is available with or without a doctor's order. NSAIDs can cause stomach bleeding or kidney problems in certain people. If you take blood thinner medicine, always ask your healthcare provider if NSAIDs are safe for you. Always read the medicine label and follow directions.      Acetaminophen decreases pain and fever. It is available without a doctor's order. Ask how much to take and how often to take it. Follow directions. Read the labels of all other medicines you are using to see if they also contain acetaminophen, or ask your doctor or pharmacist. Acetaminophen can cause liver damage if not taken correctly. Do not use more than 4 grams (4,000 milligrams) total of acetaminophen in one day.       Muscle relaxers help decrease muscle spasms and back pain.      Prescription pain medicine may be given. Ask your healthcare provider how to take this medicine safely. Some prescription pain medicines contain acetaminophen. Do not take other medicines that contain acetaminophen without talking to your healthcare provider. Too much acetaminophen may cause liver damage. Prescription pain medicine may cause constipation. Ask your healthcare provider how to prevent or treat constipation.       Take your medicine as directed. Contact your healthcare provider if you think your medicine is not helping or if you have side effects. Tell him or her if you are allergic to any medicine. Keep a list of the medicines, vitamins, and herbs you take. Include the amounts, and when and why you take them. Bring the list or the pill bottles to follow-up visits. Carry your medicine list with you in case of an emergency.    How to manage your back pain:     Apply ice on your back for 15 to 20 minutes every hour or as directed. Use an ice pack, or put crushed ice in a plastic bag. Cover it with a towel before you apply it to your skin. Ice helps prevent tissue damage and decreases pain.      Apply heat on your back for 20 to 30 minutes every 2 hours for as many days as directed. Heat helps decrease pain and muscle spasms.      Stay active as much as you can without causing more pain. Bed rest could make your back pain worse. Avoid heavy lifting until your pain is gone.      Go to physical therapy as directed. A physical therapist can teach you exercises to help improve movement and strength, and to decrease pain.    Follow up with your healthcare provider in 2 weeks, or as directed: Write down your questions so you remember to ask them during your visits.       © Copyright [employer] 2019 All illustrations and images included in CareNotes are the copyrighted property of [email or web address]. or [employer].        Hip Pain    Your hip is the joint between your upper legs and your lower pelvis. The bones, cartilage, tendons, and muscles of your hip joint perform a lot of work each day supporting your body weight and allowing you to move around.    Hip pain can range from a minor ache to severe pain in one or both of your hips. Pain may be felt on the inside of the hip joint near the groin, or the outside near the buttocks and upper thigh. You may have swelling or stiffness as well.     HOME CARE INSTRUCTIONS  Take medicines only as directed by your health care provider.  Apply ice to the injured area:  Put ice in a plastic bag.  Place a towel between your skin and the bag.  Leave the ice on for 15–20 minutes at a time, 3–4 times a day.  Keep your leg raised (elevated) when possible to lessen swelling.  Avoid activities that cause pain.  Follow specific exercises as directed by your health care provider.  Sleep with a pillow between your legs on your most comfortable side.  Record how often you have hip pain, the location of the pain, and what it feels like.     SEEK MEDICAL CARE IF:  You are unable to put weight on your leg.  Your hip is red or swollen or very tender to touch.  Your pain or swelling continues or worsens after 1 week.  You have increasing difficulty walking.  You have a fever.    SEEK IMMEDIATE MEDICAL CARE IF:  You have fallen.  You have a sudden increase in pain and swelling in your hip.    MAKE SURE YOU:  Understand these instructions.  Will watch your condition.  Will get help right away if you are not doing well or get worse.    ADDITIONAL NOTES AND INSTRUCTIONS    Please follow up with your Primary MD in 24-48 hr.  Seek immediate medical care for any new/worsening signs or symptoms.

## 2023-05-26 NOTE — ED PROVIDER NOTE - CLINICAL SUMMARY MEDICAL DECISION MAKING FREE TEXT BOX
Patient presents with left hip pain x 2 weeks. xray negative. Able to ambulate. Patient also reports that he has had a PET scan in this time period that included his pelvis which was read as normal. Feeling better after medications. Discharged with ortho and rehab follow up. Return precautions discussed.

## 2023-05-26 NOTE — ED PROVIDER NOTE - NS ED ATTENDING STATEMENT MOD
This was a shared visit with the BERNICE. I reviewed and verified the documentation and independently performed the documented:

## 2023-05-26 NOTE — ED PROVIDER NOTE - PATIENT PORTAL LINK FT
You can access the FollowMyHealth Patient Portal offered by [facility] by registering at the following website: [email or web address]. By joining [employer]’s FollowMyHealth portal, you will also be able to view your health information using other applications (apps) compatible with our system.

## 2023-05-28 NOTE — ED PROVIDER NOTE - OBJECTIVE STATEMENT
62 y/o M pmh htn, s/p open heart valvular repair 10/22, on NOAC, hx Prostate CA, s/p resection on Lupron, p/w rest failure. as per wife pt c/o HA, and vomited last night. This AM has sonorous respirations and could not be aroused. Pt intubated prehospital, VSS en route. 64 y/o M pmh htn, s/p open heart valvular repair 10/22, on NOAC, hx Prostate CA, s/p resection on Lupron, p/w resp failure and vomiting coffee-ground emesis. As per wife pt c/o HA, and vomited last night. This AM has sonorous respirations and could not be aroused. Pt intubated prehospital, VSS en route.

## 2023-05-28 NOTE — ED PROVIDER NOTE - PROGRESS NOTE DETAILS
nsx and ncc consulted for ct head, ncc at bedside- SD Ken: Case discussed w/ Neuro surgery- no emergent intervention. Case discussed w/ Dr. Schwab and Gretta BALLARD from [facility].  Pt receiving mannitol, hypertonic saline, hyperventilation. HOB elevated. Discussed management with Pt family. Wife is HCP. Ken: informed by radiologist that ETT is in proximal R bronchus. At bedside pt sating well. ETT withdrawn from 28 to 27cm at lip. Pt continues to sat well.

## 2023-05-28 NOTE — ED PROCEDURE NOTE - CPROC ED INFORMED CONSENT1
Pharmacy faxed in a request for prior authorization on:    Med: finacea  Dosage: 15% gel 50 gm  Sig:  Apply externally to the affected area twice daily  Quantity requested:  50  Form Requested: no  ID Number:  [id number]  Insurance Phone Number: [phone]    Preferred pharmacy has been set up and verified.        
This was an emergent procedure and consent was implied.
This was an emergent procedure and consent was implied.

## 2023-05-28 NOTE — ED ADULT NURSE NOTE - OBJECTIVE STATEMENT
BIBA from home for respiratory distress. Pt found by wife "gagging on his vomit and not responding". Wife reported to RN he had a headache yesterday and went to sleep, woke up this morning vomiting,

## 2023-05-28 NOTE — INITIAL ORGAN DONATION REFERRAL - NSORGANDONATIONCLINICALTRIGGER_GEN_ALL_CORE
Absence of TWO or more brain stem reflexes/[place] Coma Scale is less than or equal to 5/Family discussion withdrawal of life-sustaining therapies is anticipated

## 2023-05-28 NOTE — H&P ADULT - NSHPPHYSICALEXAM_GEN_ALL_CORE
EXAMINATION:  General: No acute distress, orally intubated   HEENT: Anicteric sclerae  Cardiac: [id number]  Lungs: Clear  Abdomen: Distended, firm, +BS  Extremities: No c/c/e  Skin/Incision Site: Clean, dry and intact  Neurologic: orally intubated, no sedation, no EO to voice or noxious stimuli, Pupils 6mm fixed, (-) corneals, (-) OC, (-) cough/gag, no movement to noxious stimuli B/L UE and LE

## 2023-05-28 NOTE — H&P ADULT - CRITICAL CARE ATTENDING COMMENT
sICH, IVH, SDH with cytotoxic edema, L--> MLS, uncal and tonsillar herniation, GCS 3, ICH score 4, presented with fixed-dilated pupils, extensor posturing, s/p ICP crisis management/neuroresuscitation with hyperventilation/HOB elevation, mannitol/hypertonic saline bolus without improvement  Pt was not a neurosurgical candidate given poor prognosis  acute respiratory failure  lactic acidosis  aspiration pneumonitis   htn      pt with poor prognosis, and family wishes for DNR and possible CMO pending arrival of additional family members     plan of care reviewed as above

## 2023-05-28 NOTE — ED PROVIDER NOTE - CLINICAL SUMMARY MEDICAL DECISION MAKING FREE TEXT BOX
.    63-year-old male, PMH as noted, on Xarelto status post distant valvular repair, brought in by EMS for respiratory failure with concern for GI bleeding.  Patient intubated, IO placed.  In the emergency department patient's O2 sat 95 to 96%, end-tidal CO2 28-25.  IV lines placed.  Dark/bloody fluid noted in ET tube, patient began to desat.  Fiberoptic visualization through ET tube demonstrated tube in esophagus, patient reintubated, with improvement of O2 sat.  Central line placed.  Patient sedated with propofol and fentanyl.  CT imaging demonstrates large intraparenchymal and subdural bleed with midline shift and herniation.  Neuro critical care, neurosurgery consulted .    63-year-old male, PMH as noted, on Xarelto status post distant valvular repair, brought in by EMS for respiratory failure with concern for GI bleeding.  Patient intubated, IO placed.  In the emergency department patient's O2 sat 95 to 96%, end-tidal CO2 28-25.  IV lines placed.  Dark/bloody fluid noted in ET tube, patient began to desat.  Fiberoptic visualization through ET tube demonstrated tube in esophagus, patient reintubated, with improvement of O2 sat.  Central line placed.  Patient sedated with propofol and fentanyl.  CT imaging demonstrates large intraparenchymal and subdural bleed with midline shift and herniation.  Neuro critical care, neurosurgery consulted. Case discussed w/ Dr. Schwab, mannitol, hypertonic saline given. As per [facility] no role for AC reversal. As per [facility], bleed and subsequent herniation/ mass effect is catastrophic. This was discussed at length with family by [facility]. Wife understands prognosis, has made pt DNR, in accordance w/ pt 's known wishes. Pt admitted to [facility].    . .    63-year-old male, PMH as noted, on Xarelto status post distant valvular repair, brought in by EMS for respiratory failure with concern for GI bleeding.  Patient intubated and IO placed prehospital.  In the emergency department patient's O2 sat 95 to 96%, end-tidal CO2 28-25. Pt unresponsive, has some spont breathing.  IV lines placed.  Dark/bloody fluid noted in ET tube, patient began to desat.  Fiberoptic visualization through ET tube demonstrated tube in esophagus, patient reintubated, with improvement of O2 sat.  Central line placed.  Patient sedated with propofol and fentanyl.  CT imaging demonstrates large intraparenchymal and subdural bleed with midline shift and herniation.  Neuro critical care, neurosurgery consulted. Case discussed w/ Dr. Schwab, mannitol, hypertonic saline given, + hyperventilation on Vent, HOB up. As per [facility] no role for AC reversal. As per [facility], bleed and subsequent herniation/ mass effect is catastrophic. This was discussed at length with family by [facility]. Wife understands prognosis, has made pt DNR, in accordance w/ pt 's known wishes. Pt admitted to [facility].    .

## 2023-05-28 NOTE — PROCEDURE NOTE - ADDITIONAL PROCEDURE DETAILS
Pt with 12 fr coloplast no balloon on catheter. Straight catheter secured using cobra dressing. remove when no longer needed. pt with traumatic Dupree attempt.

## 2023-05-28 NOTE — H&P ADULT - HISTORY OF PRESENT ILLNESS
63-year-old male with PMHx prostate cancer, s/p resection, on Lupron, followed outpatient by Dr. Morillo, and kidney cancer, open heart valvular repair, 10/2022, on Xarelto, last dose ?yesterday AM, HTN, HLD, sciatica, recent ED visit for LLE pain a/w left hip and back pain, 5/26/2023 BIBEMS for unresponsiveness and sonorous respirations. As per the wife, patient c/o headache and vomiting last evening, went to sleep, and awoke today to find her  unarousable with sonorous respirations. Patient was intubated in the field. GCS = 3. CTH revealed an acute left parietal IPH measuring 6.7 x 4.6 x 4.7 cm. The resultant mass effect causes 2 cm of rightward midline shift, compression of the left lateral ventricle with trapping of the right lateral ventricle, and central downward herniation, effacing the basal cisterns. Additional small foci of IPH are present within the cerebellar vermis. Left frontotemporal acute SDH measuring 1.3 cm in thickness. Acute SDH layers along the falx cerebri and left tentorium cerebelli. Hypodensity involving the left temporal lobe with apparent loss of   gray-white matter differentiation; acute infarct cannot be excluded. ICH score = 4. Aggressive ICP crisis management interventions, s/p Mannitol, 23% hypertonic saline, hyperventilation. Neurosurgery c/s, no neurosurgical interventions. Patient not reversed for Xarelto given poor neurological prognosis and examination. GOC discussed initiated with wife, Ludivina. Decision to make patient DNR. Admit to NSICU for management.   63-year-old male with PMHx prostate cancer, s/p resection, on Lupron, followed outpatient by Dr. Morillo, and kidney cancer, open heart valvular repair, 10/2022, on Xarelto, last dose ?yesterday AM, HTN, HLD, sciatica, recent ED visit for LLE pain a/w left hip and back pain, 5/26/2023 BIBEMS for unresponsiveness and sonorous respirations. As per the wife, patient c/o headache and vomiting last evening, went to sleep, and awoke today to find her  unarousable with sonorous respirations. Patient was intubated in the field. GCS = 3. CTH revealed an acute left parietal IPH measuring 6.7 x 4.6 x 4.7 cm. The resultant mass effect causes 2 cm of rightward midline shift, compression of the left lateral ventricle with trapping of the right lateral ventricle, and central downward herniation, effacing the basal cisterns. Additional small foci of IPH are present within the cerebellar vermis. Left frontotemporal acute SDH measuring 1.3 cm in thickness. Acute SDH layers along the falx cerebri and left tentorium cerebelli. Hypodensity involving the left temporal lobe with apparent loss of gray-white matter differentiation; acute infarct cannot be excluded. ICH score = 4. Aggressive ICP crisis management interventions, s/p Mannitol, 23% hypertonic saline, hyperventilation. Neurosurgery c/s, no neurosurgical interventions. Patient not reversed for Xarelto given poor neurological prognosis and examination. GOC discussed initiated with wife, Ludivina. Decision to make patient DNR. Admit to NSICU for management.   63-year-old male with PMHx prostate cancer, s/p resection, on Lupron, followed outpatient by Dr. Morillo, and kidney cancer, open heart valvular repair, 10/2022, on Xarelto, last dose ?yesterday AM, HTN, HLD, sciatica, recent ED visit for LLE pain a/w left hip and back pain, 5/26/2023 BIBEMS for unresponsiveness and sonorous respirations. As per the wife, patient c/o headache and vomiting last evening, went to sleep, and awoke today to find her  unarousable with sonorous respirations. Patient was intubated in the field. GCS = 3. CTH revealed an acute left parietal IPH measuring 6.7 x 4.6 x 4.7 cm. The resultant mass effect causes 2 cm of rightward midline shift, compression of the left lateral ventricle with trapping of the right lateral ventricle, and central downward herniation, effacing the basal cisterns. Additional small foci of IPH are present within the cerebellar vermis. Left frontotemporal acute SDH measuring 1.3 cm in thickness. Acute SDH layers along the falx cerebri and left tentorium cerebelli. Hypodensity involving the left temporal lobe with apparent loss of gray-white matter differentiation; acute infarct cannot be excluded. ICH score = 4/GCS 3; Aggressive ICP crisis management interventions, s/p Mannitol, 23% hypertonic saline, hyperventilation. Neurosurgery c/s, no neurosurgical interventions. Patient not reversed for Xarelto given poor neurological prognosis and the fact that it will not change neurologic/clinical outcome. GOC discussed initiated with wife, Ludivina. Decision to make patient DNR. Admit to NSICU for management.

## 2023-05-28 NOTE — H&P ADULT - NSHPSOCIALHISTORY_GEN_ALL_CORE
ETOH: obtained via wife; denies    Smoking: obtained via wife; denies    Recreational Drugs: obtained via wife; denies

## 2023-05-28 NOTE — H&P ADULT - ASSESSMENT
ASSESSMENT:    PLAN:   NEURO:  - Neuro coma checks q1hrs  - given poor neurological prognosis and examination, no further imaging   Activity: [X] Bedrest [X] PT [X] OT    PULM:  - lung protective vent settings   - End tidal CO2  - SAT/SBT as tolerated  - VAP protocol  - Avoid sedation; utilize Fentanyl IVP prn for vent synchrony/agitation  - Keep plateau pressure < 30 to maintain venous drainage  - Aggressive chest PT/pulmonary toileting/NT suctioning as needed   - HOB > 45 degrees  - Aspiration precautions  - Keep SaO2 > 95%    CV:  - Keep  - < 140  - Telemetry monitoring  - 12-lead ECG  - TTE/2D echo    RENAL:  - Strict I/Os, daily weights  - Keep euvolemic  - Keep Serum Na 145-150; 3% @ 50cc/hr; trend Serum Na q6hrs  - s/p Mannitol and 23% sodium chloride   - Keep Magnesium level > 2; Potassium > 4  - Monitor lytes, replete as needed  - IVF/IVL when tolerating PO intake    GI:  Diet: keep NPO; OGT to LCWS for abdominal distention  GI prophylaxis: Pepcid   Bowel regimen [X] Miralax [X] senna [] other:    ENDO:   - Goal Serum glucose 140-180  - HgA1c, TSH    HEME/ONC:  VTE prophylaxis: [X] SCDs   - Hold A/C and A/P for now  - VA Duplex B/L LE    ID:  - Keep normothermic, avoid fevers    LINES:    CODE STATUS: [X] DNR    DISPOSITION: [X] NCCU                 ASSESSMENT:    PLAN:   NEURO:  - Neuro coma checks q1hrs  - given poor neurological prognosis and examination, no further imaging   Activity: [X] Bedrest [X] PT [X] OT    PULM:  - lung protective vent settings   - End tidal CO2  - SAT/SBT as tolerated  - VAP protocol  - Avoid sedation; utilize Fentanyl IVP prn for vent synchrony/agitation  - Keep plateau pressure < 30 to maintain venous drainage  - Aggressive chest PT/pulmonary toileting/NT suctioning as needed   - HOB > 45 degrees  - Aspiration precautions  - Keep SaO2 > 95%    CV: h/o HTN, HLD, valve repair [10/2022]  - Keep  - < 140  - Telemetry monitoring  - 12-lead ECG  - TTE/2D echo    RENAL:  - Strict I/Os, daily weights  - Keep euvolemic  - Keep Serum Na 145-150; 3% @ 50cc/hr; trend Serum Na q6hrs  - s/p Mannitol and 23% sodium chloride   - Keep Magnesium level > 2; Potassium > 4  - Monitor lytes, replete as needed    GI:  Diet: keep NPO; OGT to LCWS for abdominal distention  GI prophylaxis: Pepcid   Bowel regimen [X] Miralax [X] senna [] other:    ENDO:   - Goal Serum glucose 140-180  - HgA1c, TSH    HEME/ONC:  VTE prophylaxis: [X] SCDs   - Hold A/C and A/P for now  - VA Duplex B/L LE    ID:  - Keep normothermic, avoid fevers    LINES:  ETT, 5/28  Right femoral CVC, 5/28  OGT, 5/28  PIVs    CODE STATUS: [X] DNR    DISPOSITION: [X] NCCU                 ASSESSMENT: 63-year-old male open heart valvular repair, on Xarelto, BIBEMS for unresponsiveness and sonorous respirations this AM; symptoms started last night with headache and vomiting, intubated in the field. GCS = 3. CTH revealed (+) acute left parietal IPH with mass effect, 2cm rightward MLS, compression of left lateral ventricle, with trapping of right lateral ventricle, central downward herniation, effacing the basal cisterns; cerebellar vermis IPH; left frontotemporal acute SDH [1.3cm thickness], and acute SDH layers along the falx cerebri and left tentorium cerebelli , ICH score = 4. Aggressive ICP crisis management interventions, s/p Mannitol, 23% hypertonic saline, hyperventilation. Neurosurgery c/s, no neurosurgical interventions. Patient not reversed for Xarelto given poor neurological prognosis and examination. GOC discussed initiated with wife, Ludivina. Decision to make patient DNR. Admit to NSICU for management.      PLAN:   NEURO:  - Neuro coma checks q1hrs  - given poor neurological prognosis and examination, no further imaging   Activity: [X] Bedrest [X] PT [X] OT    PULM:  - lung protective vent settings   - End tidal CO2  - SAT/SBT as tolerated  - VAP protocol  - Avoid sedation; utilize Fentanyl IVP prn for vent synchrony/agitation  - Keep plateau pressure < 30 to maintain venous drainage  - Aggressive chest PT/pulmonary toileting/NT suctioning as needed   - HOB > 45 degrees  - Aspiration precautions  - Keep SaO2 > 95%    CV: h/o HTN, HLD, valve repair [10/2022]  - Keep  - < 140  - Telemetry monitoring  - 12-lead ECG reviewed     RENAL:  - Strict I/Os, daily weights  - Keep euvolemic  - Keep Serum Na 145-150; 3% @ 50cc/hr  - s/p Mannitol and 23% sodium chloride   - Keep Magnesium level > 2; Potassium > 4  - Monitor lytes, replete as needed    GI:  Diet: keep NPO; OGT to LCWS for abdominal distention  GI prophylaxis: not indicated   Bowel regimen [X] Miralax [X] senna [] other:    ENDO:   - Goal Serum glucose 140-180  - HgA1c, TSH    HEME/ONC:  VTE prophylaxis: [X] SCDs   - Hold A/C and A/P for now    ID:  - Keep normothermic, avoid fevers    LINES:  ETT, 5/28  Right femoral CVC, 5/28  OGT, 5/28  PIVs    CODE STATUS: [X] DNR    DISPOSITION: [X] NCCU                 ASSESSMENT: 63-year-old male open heart valvular repair, on Xarelto, BIBEMS for unresponsiveness and sonorous respirations this AM; symptoms started last night with headache and vomiting, intubated in the field. GCS = 3. CTH revealed (+) acute left parietal IPH with mass effect, 2cm rightward MLS, compression of left lateral ventricle, with trapping of right lateral ventricle, central downward herniation, effacing the basal cisterns; cerebellar vermis IPH; left frontotemporal acute SDH [1.3cm thickness], and acute SDH layers along the falx cerebri and left tentorium cerebelli , ICH score = 4. Aggressive ICP crisis management interventions, s/p Mannitol, 23% hypertonic saline, hyperventilation. Neurosurgery c/s, no neurosurgical interventions. Patient not reversed for Xarelto given poor neurological prognosis and examination. GOC discussed initiated with wife, Ludivina. Decision to make patient DNR. Admit to NSICU for management.      PLAN:   NEURO:  - Neuro coma checks q1hrs  - given poor neurological prognosis and examination, no further imaging or significant escalation of medical care  Neurosurgical consultation-->pt not a candidate for neurosurgical intervention, given poor neurologic prognosis and the fact that it will not improve patient's outome  Activity: [X] Bedrest [X] PT [X] OT    PULM:  - lung protective vent settings   - End tidal CO2  - SAT/SBT as tolerated  - VAP protocol  - Avoid sedation; utilize Fentanyl IVP prn for vent synchrony/agitation  - Keep plateau pressure < 30 to maintain venous drainage  - Aggressive chest PT/pulmonary toileting/NT suctioning as needed   - HOB > 45 degrees  - Aspiration precautions  - Keep SaO2 > 95%    CV: h/o HTN, HLD, valve repair [10/2022]  - Keep  - < 140  - Telemetry monitoring  - 12-lead ECG reviewed     RENAL:  - Strict I/Os, daily weights  - Keep euvolemic  - Keep Serum Na 145-150; 3% @ 50cc/hr  - s/p Mannitol and 23% sodium chloride   - Keep Magnesium level > 2; Potassium > 4  - Monitor lytes, replete as needed    GI:  Diet: keep NPO; OGT to LCWS for abdominal distention  GI prophylaxis: not indicated   Bowel regimen [X] Miralax [X] senna [] other:    ENDO:   - Goal Serum glucose 140-180  - HgA1c, TSH    HEME/ONC:  VTE prophylaxis: [X] SCDs   - Hold A/C and A/P for now    ID:  - Keep normothermic, avoid fevers    LINES:  ETT, 5/28  Right femoral CVC, 5/28  OGT, 5/28  PIVs    CODE STATUS: [X] DNR  I spoke with the wife and daughter of the patient at bedside, and I explained the current morbid clinical and neurologic condition, the high likelihood of mortality and the fact there the patient's neurologic prognosis is severely poor- in the setting of this catastrophic bleed. I reviewed with brain imaging with the family and all questions were addressed. The wife indicated that her  would not want to be on life-sustaining therapy for a prolonged period, especially if there is no chance of meaningful recovery. She expressed that she would like to focus on keeping him comfortable   The patient's wife would like to wait for their son to come from out of town prior to pursuing comfort measures only.    DISPOSITION: [X] NCCU

## 2023-05-28 NOTE — ED ADULT NURSE NOTE - NSFALLHARMRISKINTERV_ED_ALL_ED
Assistance OOB with selected safe patient handling equipment if applicable/Assistance with ambulation/Communicate risk of Fall with Harm to all staff, patient, and family/Monitor gait and stability/Provide visual cue: red socks, yellow wristband, yellow gown, etc/Reinforce activity limits and safety measures with patient and family/Bed in lowest position, wheels locked, appropriate side rails in place/Call bell, personal items and telephone in reach/Instruct patient to call for assistance before getting out of bed/chair/stretcher/Non-slip footwear applied when patient is off stretcher/[place] to call system/Physically safe environment - no spills, clutter or unnecessary equipment/Purposeful Proactive Rounding/Room/bathroom lighting operational, light cord in reach

## 2023-05-28 NOTE — ED PROCEDURE NOTE - CPROC ED INDICATIONS1
emergency venous access/hypertonic/irritant infusion
airway protection/critical patient/respiratory failure

## 2023-05-28 NOTE — CONSULT NOTE ADULT - SUPERVISING ATTENDING
films and case reviewed. Pt with catastrophic hemorhage, herniaiton GCS 3. Surgical intervention would be futile in this particular patient. Family counselled by neurosurgery ACP and NCC ACP at bedside as to grave prognosis. Supportive care per NCC.

## 2023-05-28 NOTE — CONSULT NOTE ADULT - SUBJECTIVE AND OBJECTIVE BOX
HPI:  62 y/o M pmh htn, s/p open heart valvular repair 10/22, on NOAC, hx Prostate CA, s/p resection on Lupron, p/w rest failure. as per wife pt c/o HA, and vomited last night. This AM has sonorous respirations and could not be aroused. Pt intubated prehospital, VSS en route.      PAST MEDICAL & SURGICAL HISTORY:  As above    Home Medications:      Allergies    No Known Allergies    Intolerances        ROS:  [  ] A ten-point review of systems is negative except as noted   [X] Due to altered mental status/intubation, subjective information were not able to be obtained from the patient. History was obtained, to the extent possible, from review of the chart and collateral sources of information    MEDICATIONS  (STANDING):  chlorhexidine 0.12% Liquid 15 milliLiter(s) Oral Mucosa every 12 hours    MEDICATIONS  (PRN):      ICU Vital Signs Last 24 Hrs  T(C): 36.1 (28 May 2023 09:36), Max: 36.1 (28 May 2023 09:36)  T(F): 97 (28 May 2023 09:36), Max: 97 (28 May 2023 09:36)  HR: 83 (28 May 2023 09:36) (83 - 123)  BP: 149/60 (28 May 2023 09:36) (76/43 - 149/60)  BP(mean): 109 (28 May 2023 09:36) (55 - 109)  ABP: --  ABP(mean): --  RR: 28 (28 May 2023 09:36) (16 - 28)  SpO2: 98% (28 May 2023 09:36) (84% - 99%)    O2 Parameters below as of 28 May 2023 09:36  Patient On (Oxygen Delivery Method): ventilator    O2 Concentration (%): 100        I&O's Detail                            13.2   24.32 )-----------( 325      ( 28 May 2023 07:20 )             39.8     05-28    141  |  103  |  13  ----------------------------<  274<H>  4.3   |  21  |  1.0    Ca    9.1      28 May 2023 07:20  Phos  6.0     05-28  Mg     2.3     05-28    TPro  7.7  /  Alb  4.2  /  TBili  0.3  /  DBili  x   /  AST  32  /  ALT  19  /  AlkPhos  102  05-28    CARDIAC MARKERS ( 28 May 2023 07:20 )  x     / <0.01 ng/mL / x     / x     / x        On PE:    Pupils 7mm NR  No corneal reflexed  No gag reflex  No mvmt of UE to pain  No mvmt of LE to pain  Does not respond to verbal      Radiology:  < from: CT Head No Cont (05.28.23 @ 09:05) >  Multi-spatial acute intracranial hemorrhages as detailed above causing 2   cm of rightward midline shift, compression of the left lateral ventricle   with trapping of the right lateral ventricle, and central downward   herniation, effacing the basal cisterns.    Hypodensity involving the left temporal lobe with apparent loss of   gray-white matter differentiation; acute infarct cannot be excluded.    < end of copied text >      Assessment:  As above    Plan:  No neurosurgical intervention for catastrophic hemorrhage  Neuro Critical Care Management  D/W Dr. Moncada

## 2023-05-28 NOTE — ED PROCEDURE NOTE - CPROC ED TIME OUT STATEMENT1
“Patient's name, , procedure and correct site were confirmed during the [place] Timeout.”
“Patient's name, , procedure and correct site were confirmed during the [place] Timeout.”

## 2023-05-28 NOTE — ED PROVIDER NOTE - PHYSICAL EXAMINATION
CONSTITUTIONAL:  in severe acute distress.   SKIN: warm, dry  HEAD: Normocephalic; atraumatic.  EYES: PERRL, EOMI, normal sclera and conjunctiva   ENT: No nasal discharge; airway clear.  NECK: Supple; non tender.  CARD:  Regular rate and rhythm.   RESP: NO inc WOB   ABD: soft ntnd  EXT: Normal ROM.    LYMPH: No acute cervical adenopathy.  NEURO: altered, not answering questions appropriately.   PSYCH: Cooperative, appropriate.

## 2023-05-29 NOTE — PROGRESS NOTE ADULT - ASSESSMENT
ASSESSMENT: 63-year-old male open heart valvular repair, on Xarelto, BIBEMS for unresponsiveness and sonorous respirations this AM; symptoms started last night with headache and vomiting, intubated in the field. GCS = 3. CTH revealed (+) acute left parietal IPH with mass effect, 2cm rightward MLS, compression of left lateral ventricle, with trapping of right lateral ventricle, central downward herniation, effacing the basal cisterns; cerebellar vermis IPH; left frontotemporal acute SDH [1.3cm thickness], and acute SDH layers along the falx cerebri and left tentorium cerebelli , ICH score = 4. Aggressive ICP crisis management interventions, s/p Mannitol, 23% hypertonic saline, hyperventilation. Neurosurgery c/s, no neurosurgical interventions. Patient not reversed for Xarelto given poor neurological prognosis and examination. GOC discussed initiated with wife, Ludivina. Decision to make patient DNR. Admit to NSICU for management.      PLAN:   NEURO:  - Neuro coma checks q1hrs  - given poor neurological prognosis and examination, no further imaging or significant escalation of medical care  Neurosurgical consultation-->pt not a candidate for neurosurgical intervention, given poor neurologic prognosis and the fact that it will not improve patient's outome  Activity: [X] Bedrest [X] PT [X] OT    PULM:  - lung protective vent settings   - End tidal CO2  - SAT/SBT as tolerated  - VAP protocol  - Avoid sedation; utilize Fentanyl IVP prn for vent synchrony/agitation  - Keep plateau pressure < 30 to maintain venous drainage  - Aggressive chest PT/pulmonary toileting/NT suctioning as needed   - HOB > 45 degrees  - Aspiration precautions  - Keep SaO2 > 95%    CV: h/o HTN, HLD, valve repair [10/2022]  - Keep  - < 140  - Telemetry monitoring  - 12-lead ECG reviewed     RENAL:  - Strict I/Os, daily weights  - Keep euvolemic  - Keep Serum Na 145-150; 3% @ 50cc/hr  - s/p Mannitol and 23% sodium chloride   - Keep Magnesium level > 2; Potassium > 4  - Monitor lytes, replete as needed    GI:  Diet: keep NPO; OGT to LCWS for abdominal distention  GI prophylaxis: not indicated   Bowel regimen [X] Miralax [X] senna [] other:    ENDO:   - Goal Serum glucose 140-180  - HgA1c, TSH    HEME/ONC:  VTE prophylaxis: [X] SCDs   - Hold A/C and A/P for now    ID:  - Keep normothermic, avoid fevers    LINES:  ETT, 5/28  Right femoral CVC, 5/28  OGT, 5/28  PIVs    CODE STATUS: [X] DNR  I spoke with the wife and daughter of the patient at bedside, and I explained the current morbid clinical and neurologic condition, the high likelihood of mortality and the fact there the patient's neurologic prognosis is severely poor- in the setting of this catastrophic bleed. I reviewed with brain imaging with the family and all questions were addressed. The wife indicated that her  would not want to be on life-sustaining therapy for a prolonged period, especially if there is no chance of meaningful recovery. She expressed that she would like to focus on keeping him comfortable   The patient's wife would like to wait for their son to come from out of town prior to pursuing comfort measures only.    DISPOSITION: [X] NCCU                 ASSESSMENT: 63-year-old male open heart valvular repair, on Xarelto, BIBEMS for unresponsiveness and sonorous respirations this AM; symptoms started last night with headache and vomiting, intubated in the field. GCS = 3. CTH revealed (+) acute left parietal IPH with mass effect, 2cm rightward MLS, compression of left lateral ventricle, with trapping of right lateral ventricle, central downward herniation, effacing the basal cisterns; cerebellar vermis IPH; left frontotemporal acute SDH [1.3cm thickness], and acute SDH layers along the falx cerebri and left tentorium cerebelli , ICH score = 4. Aggressive ICP crisis management interventions, s/p Mannitol, 23% hypertonic saline, hyperventilation. Neurosurgery c/s, no neurosurgical interventions. Patient not reversed for Xarelto given poor neurological prognosis and examination. GOC discussed initiated with wife, Ludivina. Decision to make patient DNR. Admit to NSICU for management.          PLAN:   NEURO:  - Neuro coma checks q4hrs  - given poor neurological prognosis and examination, no further imaging or significant escalation of medical care  Neurosurgical consultation-->pt not a candidate for neurosurgical intervention, given poor neurologic prognosis and the fact that it will not improve patient's outome  Activity: [X] Bedrest [X] PT [X] OT    PULM:  - lung protective vent settings   - End tidal CO2  - SAT/SBT as tolerated  - VAP protocol  - Avoid sedation; utilize Fentanyl IVP prn for vent synchrony/agitation  - Keep plateau pressure < 30 to maintain venous drainage  - Aggressive chest PT/pulmonary toileting/NT suctioning as needed   - HOB > 45 degrees  - Aspiration precautions  - Keep SaO2 > 95%    CV: h/o HTN, HLD, valve repair [10/2022]  - Keep  - < 140  - Telemetry monitoring  - 12-lead ECG reviewed     RENAL:  - Strict I/Os, daily weights  - Keep euvolemic  - Keep Serum Na 145-150; 3% @ 50cc/hr  - s/p Mannitol and 23% sodium chloride   - Keep Magnesium level > 2; Potassium > 4  - Monitor lytes, replete as needed    GI:  Diet: keep NPO; OGT to LCWS for abdominal distention  GI prophylaxis: not indicated   Bowel regimen [X] Miralax [X] senna [] other:  ct c/a/p- suspicious for metastatic CA, given context of known primary renal CA     ENDO:   - Goal Serum glucose 140-180  - HgA1c, TSH    HEME/ONC:  VTE prophylaxis: [X] SCDs   - Hold A/C and A/P for now given ich    ID:  - Keep normothermic, avoid fevers    LINES:  ETT, 5/28  Right femoral CVC, 5/28  OGT, 5/28  PIVs    CODE STATUS: [X] DNR  family is inclined towards withdrawal of care  We discussed with Redd BRASWELL/organ donation regarding the patient's organ donor status- and the representative from [facility] (Mary, confirmation# # [id number]) indicated that in light of the patient's likely metastatic cancer (as suggested on CT chest/abdomen/pelvis) and ongoing cancer treatment, the patient is not a candidate for organ donation    DNR      DISPOSITION: [X] NCCU

## 2023-05-29 NOTE — CHART NOTE - NSCHARTNOTEFT_GEN_A_CORE
family wish for comfort measure only, DNI/DNR, withdraw of care and palliative extubation given poor prognosis of the patient illness. Family wish for comfort measure only, DNI/DNR, withdraw of care and palliative extubation given poor neurological prognosis of patient illness.

## 2023-05-29 NOTE — PROGRESS NOTE ADULT - CRITICAL CARE ATTENDING COMMENT
Patient provided with ice water.   sICH, IVH, SDH with cytotoxic edema, L--> MLS, uncal and tonsillar herniation, GCS 3, ICH score 4, presented with fixed-dilated pupils, extensor posturing, s/p ICP crisis management/neuroresuscitation with hyperventilation/HOB elevation, mannitol/hypertonic saline bolus without improvement  Pt was not a neurosurgical candidate given poor prognosis  acute respiratory failure  lactic acidosis  aspiration pneumonitis   htn      pt with poor prognosis, and family wishes for DNR and possible CMO pending arrival of additional family members     plan of care reviewed as above sICH, IVH, SDH with cytotoxic edema, L--> MLS, uncal and tonsillar herniation, GCS 3, ICH score 4, presented with fixed-dilated pupils, extensor posturing, s/p ICP crisis management/neuroresuscitation with hyperventilation/HOB elevation, mannitol/hypertonic saline bolus without improvement  Pt was not a neurosurgical candidate given poor prognosis  acute respiratory failure  lactic acidosis  aspiration pneumonitis   htn      pt with poor prognosis, and family wishes for DNR and possible CMO pending arrival of additional family members     [facility]/organ donation assessment reviewed above

## 2023-05-29 NOTE — DISCHARGE NOTE FOR THE EXPIRED PATIENT - HOSPITAL COURSE
63-year-old male with PMHx prostate cancer, s/p resection, on Lupron, followed outpatient by Dr. Morillo, and kidney cancer, open heart valvular repair, 10/2022, on Xarelto, last dose ?yesterday AM, HTN, HLD, sciatica, recent ED visit for LLE pain a/w left hip and back pain, 2023 BIBEMS for unresponsiveness and sonorous respirations. As per the wife, patient c/o headache and vomiting last evening, went to sleep, and awoke today to find her  unarousable with sonorous respirations. Patient was intubated in the field. GCS = 3. CTH revealed an acute left parietal IPH measuring 6.7 x 4.6 x 4.7 cm. The resultant mass effect causes 2 cm of rightward midline shift, compression of the left lateral ventricle with trapping of the right lateral ventricle, and central downward herniation, effacing the basal cisterns. Additional small foci of IPH are present within the cerebellar vermis. Left frontotemporal acute SDH measuring 1.3 cm in thickness. Acute SDH layers along the falx cerebri and left tentorium cerebelli. Hypodensity involving the left temporal lobe with apparent loss of gray-white matter differentiation; acute infarct cannot be excluded. ICH score = 4/GCS 3; Aggressive ICP crisis management interventions, s/p Mannitol, 23% hypertonic saline, hyperventilation. Neurosurgery c/s, no neurosurgical interventions. Patient not reversed for Xarelto given poor neurological prognosis and the fact that it will not change neurologic/clinical outcome. [facility] discussion initiated with wife, Ludivina. Decision to make patient DNR. Admit to NSICU for management.     On 2023, patient made DNR/DNI, decision to make patient comfort measures only. Patient compassionately extubated @ 1405. Patient  @ 1430.     Spoke with [facility], representative Grisleda Escobedo, confirmation number: [id number]. Patient is not a candidate for organ donation.  63-year-old male with PMHx prostate cancer, s/p resection, on Lupron, followed outpatient by Dr. Morillo, and kidney cancer, open heart valvular repair, 10/2022, on Xarelto, last dose ?yesterday AM, HTN, HLD, sciatica, recent ED visit for LLE pain a/w left hip and back pain, 2023 BIBEMS for unresponsiveness and sonorous respirations. As per the wife, patient c/o headache and vomiting last evening, went to sleep, and awoke today to find her  unarousable with sonorous respirations. Patient was intubated in the field. GCS = 3. CTH revealed an acute left parietal IPH measuring 6.7 x 4.6 x 4.7 cm. The resultant mass effect causes 2 cm of rightward midline shift, compression of the left lateral ventricle with trapping of the right lateral ventricle, and central downward herniation, effacing the basal cisterns. Additional small foci of IPH are present within the cerebellar vermis. Left frontotemporal acute SDH measuring 1.3 cm in thickness. Acute SDH layers along the falx cerebri and left tentorium cerebelli. Hypodensity involving the left temporal lobe with apparent loss of gray-white matter differentiation; acute infarct cannot be excluded. ICH score = 4/GCS 3; Aggressive ICP crisis management interventions, s/p Mannitol, 23% hypertonic saline, hyperventilation. Neurosurgery c/s, no neurosurgical interventions. Patient not reversed for Xarelto given poor neurological prognosis and the fact that it will not change neurologic/clinical outcome. [facility] discussion initiated with wife, Ludivina. Decision to make patient DNR. Admit to NSICU for management.     On 2023, patient made DNR/DNI, decision to make patient comfort measures only. Patient compassionately extubated @ 1405. Patient  @ 1430.     Spoke with [facility], representative Griselda Escobedo, confirmation number: [id number]. Patient is not a candidate for organ donation.     Case reported to ME office: Case # [id number]  ME Officer: Fide Kee 63-year-old male with PMHx prostate cancer, s/p resection, on Lupron, followed outpatient by Dr. Morillo, and kidney cancer, open heart valvular repair, 10/2022, on Xarelto, last dose ?yesterday AM, HTN, HLD, sciatica, recent ED visit for LLE pain a/w left hip and back pain, 2023 BIBEMS for unresponsiveness and sonorous respirations. As per the wife, patient c/o headache and vomiting last evening, went to sleep, and awoke today to find her  unarousable with sonorous respirations. Patient was intubated in the field. GCS = 3. CTH revealed an acute left parietal IPH measuring 6.7 x 4.6 x 4.7 cm. The resultant mass effect causes 2 cm of rightward midline shift, compression of the left lateral ventricle with trapping of the right lateral ventricle, and central downward herniation, effacing the basal cisterns. Additional small foci of IPH are present within the cerebellar vermis. Left frontotemporal acute SDH measuring 1.3 cm in thickness. Acute SDH layers along the falx cerebri and left tentorium cerebelli. Hypodensity involving the left temporal lobe with apparent loss of gray-white matter differentiation; acute infarct cannot be excluded. ICH score = 4/GCS 3; Aggressive ICP crisis management interventions, s/p Mannitol, 23% hypertonic saline, hyperventilation. Neurosurgery c/s, no neurosurgical interventions. Patient not reversed for Xarelto given poor neurological prognosis and the fact that it will not change neurologic/clinical outcome. [facility] discussion initiated with wife, Ludivina. Decision to make patient DNR. Admit to NSICU for management.     On 2023, patient made DNR/DNI, decision to make patient comfort measures only. Patient compassionately extubated @ 1405. Patient  @ 1430. Family declined autopsy.    Spoke with [facility], representative Griselda Escobedo, confirmation number: [id number]. Patient is not a candidate for organ donation.     Case reported to ME office: Case # [id number]  ME Officer: Fide Kee 63-year-old male with PMHx prostate cancer, s/p resection, on Lupron, followed outpatient by Dr. Morillo, and kidney cancer, open heart valvular repair, 10/2022, on Xarelto, last dose ?yesterday AM, HTN, HLD, sciatica, recent ED visit for LLE pain a/w left hip and back pain, 2023 BIBEMS for unresponsiveness and sonorous respirations. As per the wife, patient c/o headache and vomiting last evening, went to sleep, and awoke today to find her  unarousable with sonorous respirations. Patient was intubated in the field. GCS = 3. CTH revealed an acute left parietal IPH measuring 6.7 x 4.6 x 4.7 cm. The resultant mass effect causes 2 cm of rightward midline shift, compression of the left lateral ventricle with trapping of the right lateral ventricle, and central downward herniation, effacing the basal cisterns. Additional small foci of IPH are present within the cerebellar vermis. Left frontotemporal acute SDH measuring 1.3 cm in thickness. Acute SDH layers along the falx cerebri and left tentorium cerebelli. Hypodensity involving the left temporal lobe with apparent loss of gray-white matter differentiation; acute infarct cannot be excluded. ICH score = 4/GCS 3; Aggressive ICP crisis management interventions, s/p Mannitol, 23% hypertonic saline, hyperventilation. Neurosurgery c/s, no neurosurgical interventions. Patient not reversed for Xarelto given poor neurological prognosis and the fact that it will not change neurologic/clinical outcome. [facility] discussion initiated with wife, Ludivina. Decision to make patient DNR. Admit to NSICU for management.     On 2023, patient made DNR/DNI, decision to make patient comfort measures only. Patient compassionately extubated @ 1405. Patient  @ 1430. Family declined autopsy.    Spoke with [facility], representative Griselda Escobedo, confirmation number: [id number]. Patient is not a candidate for organ donation.     Case reported to ME office: Case # [id number]  ME Offical: Fide Kee

## 2023-05-29 NOTE — PROGRESS NOTE ADULT - SUBJECTIVE AND OBJECTIVE BOX
SUMMARY: HPI:  63-year-old male with PMHx prostate cancer, s/p resection, on Lupron, followed outpatient by Dr. Morillo, and kidney cancer, open heart valvular repair, 10/2022, on Xarelto, last dose ?yesterday AM, HTN, HLD, sciatica, recent ED visit for LLE pain a/w left hip and back pain, 5/26/2023 BIBEMS for unresponsiveness and sonorous respirations. As per the wife, patient c/o headache and vomiting last evening, went to sleep, and awoke today to find her  unarousable with sonorous respirations. Patient was intubated in the field. GCS = 3. CTH revealed an acute left parietal IPH measuring 6.7 x 4.6 x 4.7 cm. The resultant mass effect causes 2 cm of rightward midline shift, compression of the left lateral ventricle with trapping of the right lateral ventricle, and central downward herniation, effacing the basal cisterns. Additional small foci of IPH are present within the cerebellar vermis. Left frontotemporal acute SDH measuring 1.3 cm in thickness. Acute SDH layers along the falx cerebri and left tentorium cerebelli. Hypodensity involving the left temporal lobe with apparent loss of gray-white matter differentiation; acute infarct cannot be excluded. ICH score = 4/GCS 3; Aggressive ICP crisis management interventions, s/p Mannitol, 23% hypertonic saline, hyperventilation. Neurosurgery c/s, no neurosurgical interventions. Patient not reversed for Xarelto given poor neurological prognosis and the fact that it will not change neurologic/clinical outcome. GOC discussed initiated with wife, Lduivina. Decision to make patient DNR. Admit to NSICU for management.   (28 May 2023 11:42)      OVERNIGHT EVENTS: increased UO, DI negative, levo uptitrated    ALLERGIES: Allergies    No Known Allergies    Intolerances        ADMISSION SCORES:   GCS: 3  HH: MF:   NIHSS:   ICH Score:4    SEDATION SCORES:  RASS:   CAM-ICU:     REVIEW OF SYSTEMS: Negative except for that of HPI    VITALS: [x] Reviewed  ICU Vital Signs Last 24 Hrs  T(C): 34 (28 May 2023 16:00), Max: 36.1 (28 May 2023 09:36)  T(F): 93.2 (28 May 2023 16:00), Max: 97 (28 May 2023 09:36)  HR: 62 (29 May 2023 01:15) (47 - 123)  BP: 106/56 (29 May 2023 01:15) (72/52 - 211/91)  BP(mean): 73 (29 May 2023 01:15) (55 - 109)  ABP: --  ABP(mean): --  RR: 24 (29 May 2023 01:15) (16 - 28)  SpO2: 98% (29 May 2023 01:15) (84% - 100%)      05-28-23 @ 07:01  -  05-29-23 @ 02:18  --------------------------------------------------------  IN: 495.3 mL / OUT: 2370 mL / NET: -1874.7 mL          LABS:  Na: 154 (05-29 @ 00:10), 141 (05-28 @ 07:20)  K: 4.5 (05-29 @ 00:10), 4.3 (05-28 @ 07:20)  Cl: 125 (05-29 @ 00:10), 103 (05-28 @ 07:20)  CO2: 18 (05-29 @ 00:10), 21 (05-28 @ 07:20)  BUN: 18 (05-29 @ 00:10), 13 (05-28 @ 07:20)  Cr: 1.4 (05-29 @ 00:10), 1.0 (05-28 @ 07:20)  Glu: 197(05-29 @ 00:10), 274(05-28 @ 07:20)    Hgb: 13.2 (05-28 @ 07:20)  Hct: 39.8 (05-28 @ 07:20)  WBC: 24.32 (05-28 @ 07:20)  Plt: 325 (05-28 @ 07:20)    INR: 1.20 05-28-23 @ 07:20  PTT: 34.3 05-28-23 @ 07:20          LIVER FUNCTIONS - ( 28 May 2023 07:20 )  Alb: 4.2 g/dL / Pro: 7.7 g/dL / ALK PHOS: 102 U/L / ALT: 19 U/L / AST: 32 U/L / GGT: x           ABG - ( 28 May 2023 10:05 )  pH, Arterial: 7.36  pH, Blood: x     /  pCO2: 34    /  pO2: 58    / HCO3: 19    / Base Excess: -5.4  /  SaO2: 100.0   Mode: AC/ CMV (Assist Control/ Continuous Mandatory Ventilation), RR (machine): 24, TV (machine): 450, FiO2: 70, PEEP: 9, ITime: 1.5, MAP: 14, PIP: 24    MEDICATIONS  (STANDING):  chlorhexidine 0.12% Liquid 15 milliLiter(s) Oral Mucosa every 12 hours  niCARdipine Infusion 5 mG/Hr (25 mL/Hr) IV Continuous <Continuous>  norepinephrine Infusion 0.05 MICROgram(s)/kG/Min (4.61 mL/Hr) IV Continuous <Continuous>  sodium chloride 3%. 500 milliLiter(s) (30 mL/Hr) IV Continuous <Continuous>    MEDICATIONS  (PRN):      IMAGING/DATA: [x] Reviewed    EXAMINATION:  General: No acute distress, orally intubated   HEENT: Anicteric sclerae  Cardiac: [id number]  Lungs: Clear  Abdomen: Distended, firm, +BS  Extremities: No c/c/e  Skin/Incision Site: Clean, dry and intact  Neurologic: orally intubated, no sedation, no EO to voice or noxious stimuli, Pupils 6mm fixed, (-) corneals, (-) OC, (-) cough/gag, no movement to noxious stimuli B/L UE and LE      DEVICES:   [] Restraints [] PIVs [] ET tube [] central line [] PICC [] arterial line [] rob [] NGT/OGT [] EVD [] LD [] MARLIN/HMV [] LiCOX [] ICP monitor [] Trach [] PEG [] Chest Tube [] other:     SUMMARY: HPI:  63-year-old male with PMHx prostate cancer, s/p resection, on Lupron, followed outpatient by Dr. Morillo, and kidney cancer, open heart valvular repair, 10/2022, on Xarelto, last dose ?yesterday AM, HTN, HLD, sciatica, recent ED visit for LLE pain a/w left hip and back pain, 5/26/2023 BIBEMS for unresponsiveness and sonorous respirations. As per the wife, patient c/o headache and vomiting last evening, went to sleep, and awoke today to find her  unarousable with sonorous respirations. Patient was intubated in the field. GCS = 3. CTH revealed an acute left parietal IPH measuring 6.7 x 4.6 x 4.7 cm. The resultant mass effect causes 2 cm of rightward midline shift, compression of the left lateral ventricle with trapping of the right lateral ventricle, and central downward herniation, effacing the basal cisterns. Additional small foci of IPH are present within the cerebellar vermis. Left frontotemporal acute SDH measuring 1.3 cm in thickness. Acute SDH layers along the falx cerebri and left tentorium cerebelli. Hypodensity involving the left temporal lobe with apparent loss of gray-white matter differentiation; acute infarct cannot be excluded. ICH score = 4/GCS 3; Aggressive ICP crisis management interventions, s/p Mannitol, 23% hypertonic saline, hyperventilation. Neurosurgery c/s, no neurosurgical interventions. Patient not reversed for Xarelto given poor neurological prognosis and the fact that it will not change neurologic/clinical outcome. GOC discussed initiated with wife, Ludivina. Decision to make patient DNR. Admit to NSICU for management.   (28 May 2023 11:42)      OVERNIGHT EVENTS: increased UO, DI negative, levo uptitrated    ALLERGIES: Allergies    No Known Allergies    Intolerances        ADMISSION SCORES:   GCS: 3  HH: MF:   NIHSS:   ICH Score:4        IMAGING/DATA: [x] Reviewed    EXAMINATION:  General: comatose and recumbent in bed, orally intubated   HEENT: Anicteric sclerae  Cardiac: [id number]  Lungs: Clear  Abdomen: Distended, soft, +BS  Extremities: LLE mottling, b/l UE trace edema  Skin/Incision Site: Clean, dry and intact  Neurologic: orally intubated, comatose, no EO , Pupils 5mm fixed, (-) corneals, (-) OC, (-) cough/gag, flaccid  on  noxious stimuli B/L UE and LE        ICU Vital Signs Last 24 Hrs  T(C): 34.6 (29 May 2023 08:00), Max: 35.6 (28 May 2023 13:28)  T(F): 94.2 (29 May 2023 08:00), Max: 96 (28 May 2023 13:28)  HR: 65 (29 May 2023 10:00) (47 - 66)  BP: 105/61 (29 May 2023 10:00) (72/52 - 130/77)  BP(mean): 78 (29 May 2023 10:00) (61 - 97)  ABP: --  ABP(mean): --  RR: 20 (29 May 2023 10:00) (20 - 25)  SpO2: 95% (29 May 2023 10:00) (95% - 100%)      05-28-23 @ 07:01  -  05-29-23 @ 07:00  --------------------------------------------------------  IN: 582.1 mL / OUT: 3360 mL / NET: -2777.9 mL    05-29-23 @ 07:01  -  05-29-23 @ 11:21  --------------------------------------------------------  IN: 18 mL / OUT: 700 mL / NET: -682 mL        Mode: AC/ CMV (Assist Control/ Continuous Mandatory Ventilation), RR (machine): 20, TV (machine): 450, FiO2: 50, PEEP: 9, ITime: 1, MAP: 13, PIP: 25    chlorhexidine 0.12% Liquid 15 milliLiter(s) Oral Mucosa every 12 hours  chlorhexidine 2% Cloths 1 Application(s) Topical <User Schedule>  niCARdipine Infusion 5 mG/Hr (25 mL/Hr) IV Continuous <Continuous>  norepinephrine Infusion 0.05 MICROgram(s)/kG/Min (4.61 mL/Hr) IV Continuous <Continuous>      LABS:  Na: 159 (05-29 @ 05:10), 154 (05-29 @ 00:10), 141 (05-28 @ 07:20)  K: 4.1 (05-29 @ 05:10), 4.5 (05-29 @ 00:10), 4.3 (05-28 @ 07:20)  Cl: 130 (05-29 @ 05:10), 125 (05-29 @ 00:10), 103 (05-28 @ 07:20)  CO2: 18 (05-29 @ 05:10), 18 (05-29 @ 00:10), 21 (05-28 @ 07:20)  BUN: 17 (05-29 @ 05:10), 18 (05-29 @ 00:10), 13 (05-28 @ 07:20)  Cr: 1.4 (05-29 @ 05:10), 1.4 (05-29 @ 00:10), 1.0 (05-28 @ 07:20)  Glu: 177(05-29 @ 05:10), 197(05-29 @ 00:10), 274(05-28 @ 07:20)    Hgb: 13.1 (05-29 @ 05:10), 13.2 (05-28 @ 07:20)  Hct: 39.3 (05-29 @ 05:10), 39.8 (05-28 @ 07:20)  WBC: 13.70 (05-29 @ 05:10), 24.32 (05-28 @ 07:20)  Plt: 241 (05-29 @ 05:10), 325 (05-28 @ 07:20)    INR: 1.20 05-28-23 @ 07:20  PTT: 34.3 05-28-23 @ 07:20          LIVER FUNCTIONS - ( 28 May 2023 07:20 )  Alb: 4.2 g/dL / Pro: 7.7 g/dL / ALK PHOS: 102 U/L / ALT: 19 U/L / AST: 32 U/L / GGT: x           ABG - ( 29 May 2023 03:56 )  pH, Arterial: 7.41  pH, Blood: x     /  pCO2: 26    /  pO2: 154   / HCO3: 16    / Base Excess: -6.4  /  SaO2: 99.5

## 2023-05-29 NOTE — DISCHARGE NOTE FOR THE EXPIRED PATIENT - OTHER SIGNIFICANT FINDINGS
ICU Vital Signs Last 24 Hrs  T(C): 34.8 (29 May 2023 12:00), Max: 34.8 (29 May 2023 04:00)  T(F): 94.7 (29 May 2023 12:00), Max: 94.7 (29 May 2023 04:00)  HR: 72 (29 May 2023 14:00) (47 - 72)  BP: 88/54 (29 May 2023 14:00) (88/54 - 130/77)  BP(mean): 65 (29 May 2023 14:00) (65 - 97)  RR: 20 (29 May 2023 14:00) (20 - 24)  SpO2: 94% (29 May 2023 14:00) (94% - 100%)    05-28-23 @ 07:01  -  05-29-23 @ 07:00  --------------------------------------------------------  IN: 582.1 mL / OUT: 3360 mL / NET: -2777.9 mL    05-29-23 @ 07:01  -  05-29-23 @ 14:35  --------------------------------------------------------  IN: 30.6 mL / OUT: 800 mL / NET: -769.4 mL    Mode: AC/ CMV (Assist Control/ Continuous Mandatory Ventilation), RR (machine): 20, TV (machine): 450, FiO2: 50, PEEP: 9, ITime: 1, MAP: 13, PIP: 25    glycopyrrolate Injectable 0.2 milliGRAM(s) IV Push every 6 hours PRN  morphine  - Injectable 4 milliGRAM(s) IV Push every 10 minutes PRN    LABS:  Na: 159 (05-29 @ 05:10), 154 (05-29 @ 00:10), 141 (05-28 @ 07:20)  K: 4.1 (05-29 @ 05:10), 4.5 (05-29 @ 00:10), 4.3 (05-28 @ 07:20)  Cl: 130 (05-29 @ 05:10), 125 (05-29 @ 00:10), 103 (05-28 @ 07:20)  CO2: 18 (05-29 @ 05:10), 18 (05-29 @ 00:10), 21 (05-28 @ 07:20)  BUN: 17 (05-29 @ 05:10), 18 (05-29 @ 00:10), 13 (05-28 @ 07:20)  Cr: 1.4 (05-29 @ 05:10), 1.4 (05-29 @ 00:10), 1.0 (05-28 @ 07:20)  Glu: 177(05-29 @ 05:10), 197(05-29 @ 00:10), 274(05-28 @ 07:20)    Hgb: 13.1 (05-29 @ 05:10), 13.2 (05-28 @ 07:20)  Hct: 39.3 (05-29 @ 05:10), 39.8 (05-28 @ 07:20)  WBC: 13.70 (05-29 @ 05:10), 24.32 (05-28 @ 07:20)  Plt: 241 (05-29 @ 05:10), 325 (05-28 @ 07:20)    INR: 1.20 05-28-23 @ 07:20  PTT: 34.3 05-28-23 @ 07:20    LIVER FUNCTIONS - ( 28 May 2023 07:20 )  Alb: 4.2 g/dL / Pro: 7.7 g/dL / ALK PHOS: 102 U/L / ALT: 19 U/L / AST: 32 U/L / GGT: x           ABG - ( 29 May 2023 03:56 )  pH, Arterial: 7.41  pH, Blood: x     /  pCO2: 26    /  pO2: 154   / HCO3: 16    / Base Excess: -6.4  /  SaO2: 99.5      Imaging:  EXAM:  CT BRAIN [5/28/2023]:  FINDINGS:  There is an acute left parietal intraparenchymal hematoma measuring 6.7 x 4.6 x 4.7 cm. The resultant mass effect causes 2 cm of rightward midline shift, compression of the left lateral ventricle with trapping of the right lateral ventricle, and central downward herniation, effacing the   basal cisterns. Additional small foci of intraparenchymal hemorrhage are present within the cerebellar vermis. Left frontotemporal acute subdural hematoma measuring 1.3 cm in thickness. Acute subdural hemorrhage layers along the falx cerebri and left tentorium cerebelli.  Hypodensity involving the left temporal lobe with apparent loss of gray-white matter differentiation; acute infarct cannot be excluded.  The visualized paranasal sinuses and mastoid air cells are clear.  The calvarium is intact.  IMPRESSION:  Multi-spatial acute intracranial hemorrhages as detailed above causing 2 cm of rightward midline shift, compression of the left lateral ventricle with trapping of the right lateral ventricle, and central downward herniation, effacing the basal cisterns.  Hypodensity involving the left temporal lobe with apparent loss of gray-white matter differentiation; acute infarct cannot be excluded.

## 2023-06-01 DIAGNOSIS — G93.5 COMPRESSION OF BRAIN: ICD-10-CM

## 2023-06-01 DIAGNOSIS — C64.9 MALIGNANT NEOPLASM OF UNSPECIFIED KIDNEY, EXCEPT RENAL PELVIS: ICD-10-CM

## 2023-06-01 DIAGNOSIS — Z51.5 ENCOUNTER FOR PALLIATIVE CARE: ICD-10-CM

## 2023-06-01 DIAGNOSIS — I62.01 NONTRAUMATIC ACUTE SUBDURAL HEMORRHAGE: ICD-10-CM

## 2023-06-01 DIAGNOSIS — I10 ESSENTIAL (PRIMARY) HYPERTENSION: ICD-10-CM

## 2023-06-01 DIAGNOSIS — Z79.01 LONG TERM (CURRENT) USE OF ANTICOAGULANTS: ICD-10-CM

## 2023-06-01 DIAGNOSIS — C79.9 SECONDARY MALIGNANT NEOPLASM OF UNSPECIFIED SITE: ICD-10-CM

## 2023-06-01 DIAGNOSIS — I61.5 NONTRAUMATIC INTRACEREBRAL HEMORRHAGE, INTRAVENTRICULAR: ICD-10-CM

## 2023-06-01 DIAGNOSIS — J69.0 PNEUMONITIS DUE TO INHALATION OF FOOD AND VOMIT: ICD-10-CM

## 2023-06-01 DIAGNOSIS — E78.5 HYPERLIPIDEMIA, UNSPECIFIED: ICD-10-CM

## 2023-06-01 DIAGNOSIS — E87.20 ACIDOSIS, UNSPECIFIED: ICD-10-CM

## 2023-06-01 DIAGNOSIS — J96.01 ACUTE RESPIRATORY FAILURE WITH HYPOXIA: ICD-10-CM

## 2023-06-01 DIAGNOSIS — Z85.46 PERSONAL HISTORY OF MALIGNANT NEOPLASM OF PROSTATE: ICD-10-CM

## 2023-06-01 DIAGNOSIS — I61.4 NONTRAUMATIC INTRACEREBRAL HEMORRHAGE IN CEREBELLUM: ICD-10-CM

## 2023-06-01 DIAGNOSIS — G93.6 CEREBRAL EDEMA: ICD-10-CM

## 2023-06-01 DIAGNOSIS — Z79.818 LONG TERM (CURRENT) USE OF OTHER AGENTS AFFECTING ESTROGEN RECEPTORS AND ESTROGEN LEVELS: ICD-10-CM

## 2023-06-01 DIAGNOSIS — I61.1 NONTRAUMATIC INTRACEREBRAL HEMORRHAGE IN HEMISPHERE, CORTICAL: ICD-10-CM

## 2023-06-01 DIAGNOSIS — Z66 DO NOT RESUSCITATE: ICD-10-CM
